# Patient Record
Sex: FEMALE | Race: WHITE | NOT HISPANIC OR LATINO | Employment: UNEMPLOYED | URBAN - METROPOLITAN AREA
[De-identification: names, ages, dates, MRNs, and addresses within clinical notes are randomized per-mention and may not be internally consistent; named-entity substitution may affect disease eponyms.]

---

## 2017-01-18 ENCOUNTER — ALLSCRIPTS OFFICE VISIT (OUTPATIENT)
Dept: OTHER | Facility: OTHER | Age: 42
End: 2017-01-18

## 2017-02-22 DIAGNOSIS — Z12.31 ENCOUNTER FOR SCREENING MAMMOGRAM FOR MALIGNANT NEOPLASM OF BREAST: ICD-10-CM

## 2017-03-20 DIAGNOSIS — E03.9 HYPOTHYROIDISM: ICD-10-CM

## 2017-11-14 ENCOUNTER — ALLSCRIPTS OFFICE VISIT (OUTPATIENT)
Dept: OTHER | Facility: OTHER | Age: 42
End: 2017-11-14

## 2017-11-15 NOTE — PROGRESS NOTES
Assessment    1  Upper respiratory infection (465 9) (J06 9)    Plan  Upper respiratory infection    · GuaiFENesin -10 MG/5ML Oral Syrup; TAKE 2 TEASPOONS EVERY 6HOURS AS NEEDED FOR COUGH   · Cefprozil 500 MG Oral Tablet; take 1 tablet twice a day   · Follow Up if Not Better Evaluation and Treatment  Follow-up  Status: Complete  Done:14Nov2017 02:53PM   · Drink at least 6 glasses of water or juice a day ; Status:Complete;   Done: 14Nov201702:53PM   · You need to quit smoking ; Status:Complete;   Done: 73VME2921 02:53PM   · Call [phone] if: The symptoms are not better in 7 days ; Status:Complete;   Done:14Nov2017 02:53PM   · Call [phone] if: The symptoms seem worse ; Status:Complete;   Done:14Nov2017 02:53PM    Discussion/Summary    Script for codeine cough suppressant for nighttime  Claritin 10 milligrams daily for nasal congestion Sample of Symbicort 160/4 5 two puffs twice a day while on antibiotics  Chief Complaint    1  Cough   2  Wheezing    History of Present Illness  HPI: Patient presents with acute onset productive cough with wheezing over the last 24 hours  Nasal congestion  She has been using an over-the-counter cold remedy  Smoker less than half pack per day  No history of seasonal allergies or asthma  She was treated for pneumonia last year      Review of Systems   Constitutional: no fever-- and-- no chills  ENT: no earache,-- no sore throat-- and-- no nasal discharge  Cardiovascular: no chest pain  Respiratory: as noted in HPI,-- no shortness of breath,-- no orthopnea-- and-- no PND  Gastrointestinal: no nausea,-- no vomiting-- and-- no diarrhea  Musculoskeletal: myalgias  Integumentary: no rashes  Neurological: no headache  Active Problems  1  Abnormal finding on mammography (793 80) (R92 8)   2  Bereavement (V62 82) (Z63 4)   3  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)   4  Hypothyroidism (244 9) (E03 9)   5   Moderate single current episode of major depressive disorder (296 22) (F32 1)    Past Medical History  1  History of Community acquired pneumonia (5) (J18 9)   2  History of acute bronchitis (V12 69) (Z87 09)   3  History of acute sinusitis (V12 69) (Z87 09)   4  History of acute sinusitis (V12 69) (Z87 09)    Family History  Mother    1  Family history of Breast Cancer (V16 3)   2  Family history of Essential hypertension, benign  Father    3  Family history of Diabetes Mellitus (V18 0)    Social History     · Bereavement (V62 82) (Z63 4)   · Current every day smoker (305 1) (F17 200)   · No drug use   · Tobacco use (305 1) (Z72 0)   · 10 cigs/day for at least 20 year    Current Meds   1  ALPRAZolam 0 5 MG Oral Tablet; 1/2-1 tab prior to travel - can repeat q 8 hours - do not combine with driving or alcohol; Therapy: 25RFI8036 to (77 873 135); Last Rx:2017 Ordered   2  BuPROPion HCl ER (SR) 100 MG Oral Tablet Extended Release 12 Hour; TAKE 1 TABLET DAILY; Therapy: 58IPI2827 to (Evaluate:19Woj9503)  Requested for: 13LXC9562; Last Rx:2017 Ordered   3  Citalopram Hydrobromide 10 MG Oral Tablet; TAKE 1 TABLET DAILY; Therapy: 04FCF1869 to ([id number])  Requested for: 35IEY9990; Last Rx:2017 Ordered   4  Levothyroxine Sodium 125 MCG Oral Tablet; TAKE 1 TABLET BY MOUTH EVERY DAY; Therapy: 62HLO2663 to (XAMWOGOD:10AVW3316)  Requested for: 65UHW2840; Last Rx:2017 Ordered    Allergies  1  No Known Drug Allergies    Vitals   Recorded: 02RFF3553 02:29PM Recorded: 87FNB5881 02:26PM   Temperature  99 3 F, Tympanic   Heart Rate  82   Respiration  16   Systolic  609, LUE, Sitting   Diastolic  76, LUE, Sitting   Height  5 ft 3 in   Weight  179 lb 6 oz   BMI Calculated  31 77   BSA Calculated  1 85   O2 Saturation 97        Physical Exam   Constitutional  General appearance: No acute distress, well appearing and well nourished  Head and Face  Palpation of the face and sinuses: No sinus tenderness     Eyes  Conjunctiva and lids: No swelling, erythema or discharge  Ears, Nose, Mouth, and Throat  Otoscopic examination: Tympanic membranes translucent with normal light reflex  Canals patent without erythema  Oropharynx: Abnormal   The posterior pharynx was erythematous  Neck  Neck: Supple, symmetric, trachea midline, no masses  Thyroid: Normal, no thyromegaly  Pulmonary  Respiratory effort: No increased work of breathing or signs of respiratory distress  Auscultation of lungs: Clear to auscultation  no rales or crackles were heard bilaterally  no wheezing  no diminished breath sounds  Cardiovascular  Auscultation of heart: Normal rate and rhythm, normal S1 and S2, no murmurs  Lymphatic  Palpation of lymph nodes in neck: No lymphadenopathy  Skin  Skin and subcutaneous tissue: Normal without rashes or lesions         Signatures   Electronically signed by : BALWINDER Ferrer ; Nov 14 2017  2:54PM EST                       (Author)

## 2018-01-09 NOTE — PSYCH
Progress Note  Psychotherapy Provided [facility]: Individual Psychotherapy 50 minutes provided today  Goals addressed in session:   1-3 D: Pt was seen for Individual Therapy session  Lucila Vinson spoke about her feelings of immediate improvement upon starting Lexapro as prescribed by her pcp following her last therapy session with this worker  These changes in her feelings milo functioning wer reviewed at length  A: Lucila Vinson stated today that she is no longer struggling with the responsibilities of caring for her nephew and mother or in raising her own daughters now that she is feeling better  She is also enjoying herself and her life again  P: She agreed to remain on her SSRI and one additional session has been scheduled  Pain Scale and Suicide Risk [facility]: Current Pain Assessment: no pain   Behavioral Health Treatment Plan [address] [place] [address] [address]: Diagnosis and Treatment Plan explained to patient, patient relates understanding diagnosis and is agreeable to Treatment Plan  Assessment    1   Moderate single current episode of major depressive disorder (296 22) (F32 1)    Signatures   Electronically signed by : Evelyn Castro LCSW; Dec 20 2016  1:45PM EST                       (Author)

## 2018-01-10 NOTE — PSYCH
Progress Note  Psychotherapy Provided [facility]: Individual Psychotherapy 50 minutes provided today  Goals addressed in session:   1-3 D: Pt was seen for Individual Therapy session  Terence Baez spoke about her feelings of sadness  Reasons for these were processed and a PHQ-9 was completed  She reported that she is feeling"off the rails" in the words of her pcp  A: Terence Baez has become not only the caretaker of her nephew, since the murder of her sister, but now the caretaker of her mother since the passing of her father  She is struggling with these responsibilities and in raising her own daughters  P: She agreed to contact her pcp in order to start an SSRI and additional sessions were scheduled  Pain Scale and Suicide Risk [facility]: Current Pain Assessment: no pain   Behavioral Health Treatment Plan [address] [place] [address] [address]: Diagnosis and Treatment Plan explained to patient, patient relates understanding diagnosis and is agreeable to Treatment Plan  Assessment    1   Moderate single current episode of major depressive disorder (296 22) (F32 1)    Signatures   Electronically signed by : Karolina Erickson LCSW; Nov 16 2016  3:54PM EST                       (Author)

## 2018-01-10 NOTE — PSYCH
Behavioral Health Outpatient Intake    Referred By: DR Erendira Fowler  Intake Questions: there are no developmental disabilities  the patient does not have a hearing impairment  the patient does not have an ICM or CTT  patient is not taking injectable psychiatric medications  Employment: The patient is not employed  at [employer]  Emergency Contact Information:   Emergency Contact: Rose Ybarra   Relationship to Patient:    Phone Number: [phone]   Previous Psychiatric Treatment: She has not been previously seen by a psychiatrist     She has previously been seen by a therapist  ?, 6 YRS AGO   History: no  service  She has not had combat service  She was not activated into federal active duty as a member of the [employer] or [employer]  Insurance Subscriber: Rose Ybarra   : 174   Employer: [employer]   Primary Insurance: [facility]   Phone number: [phone]   ID number: [id number]   Group number: [id number]         Presenting Problem (in patient's words): GRIEF COUNSELING, FATHER PASSED IN MARCH, SISTER PASSED 5 YRS AGO  Substance Abuse: NONE  Previous Treatment: The patient has not been seen here in the past      Accepted as Patient   10/31/16 @ 8AM     Primary Care Physician: Fabio Jaffe 59   Electronically signed by : Cammie Patel, ; Sep 21 2016  9:48AM EST                       (Author)

## 2018-01-11 NOTE — PSYCH
1  Moderate single current episode of major depressive disorder (296 22) (F32 1)      Date of Initial Treatment Plan: 11/16/2016  Date of Current Treatment Plan: 11/16/2016  Treatment Plan 1  Area of Needs: I have high expectations and always get disappointed  I am angry and resentful  Long Term Goals:   I want to be truly happy  Target Date: 3/11/2017      I want to work through these feelings  Target Date: 3/11/2017      I want to take control of my life back   Target Date: 3/11/2017    Short Term Objectives:   Goal 1:   I will talk to my pcp and  about going on meds  Target Date: 2/11/2017      Goal 2:   I will use therapy tot alk about my past    Target Date: 2/11/2017      Goal 3:   I will return to the gym  I will get back to reading  Target Date: 2/11/2017      GOAL 1: Modality: Individual 2 x per month Target Date: 3/11/2017       The person(s) responsible for carrying out the plan is Marily  GOAL 2: Modality: Individual 2 x per month Target Date: 3/11/2017       The person(s) responsible for carrying out the plan is Gabe Wells  GOAL 3: Modality: Individual 2 x per month Target Date: 3/11/2017         The person(s) responsible for carrying out the plan is Marily  The expected length of service is 3/11/2017  CLIENT COMMENTS / Please share your thoughts, feelings, need and/or experiences regarding your treatment plan: _____________________________________________________________________________________________________________________________________________________________________________________________________________________________________________________________________________________________________________________ Date/Time: ______________     Patient Signature: _________________________________ Date/Time: ______________        1  Abnormal finding on mammography (723 65) (R92 8)   2   Bee sting, accidental or unintentional, initial encounter (989 5,E905 3) (T63 441A)   3  Bereavement (V62 82) (Z63 4)   4  Community acquired pneumonia (5) (J18 9)   5  Encounter for gynecological examination without abnormal finding (V72 31) (Z01 419)   6  Encounter for screening mammogram for breast cancer (V76 12) (Z12 31)   7  Head pain (784 0) (R51)   8   Hypothyroidism (244 9) (E03 9)     Electronically signed by : Grayson Banuelos LCSW; Nov 16 2016  3:04PM EST                       (Author)

## 2018-01-12 NOTE — PSYCH
History of Present Illness    Pre-morbid level of function and History of Present Illness:  Dad  in March  ( there is a story here) That is not why she is here-- 6 years ago, all of it started  moved to  4 yrs ago  Selenealan Quijano lives with them  [place]  Reason for evaluation and partial hospitalization as an alternative to inpatient hospitalization:   I am raising my sister's son-- he looks like her, he acts like her  I am so mad at her  She brought so much bad into my life  This is so hard for me  I never thought past the moment I did it  I am his Robbert Cornea  He listens to me  He is a follower, though  His dad is a whole other story  He is in residential now  Family Constellation (include parents, relationship with each and pertinent Psych/Medical History): Mother: lives with her and sister-- "joint custody" , , feels bad for her mom- just lost her best friend  Spouse: Anna Ortiz, 15 yrs  Father:   Children: [id number]   Sibling: Nayla Smaller   Sibling: Crystal (youngest)   Children: Jammie Hernandez 9   Other: Saritha Terrell   The patient relates best to - my voice of reason  Domestic Violence: No past history of domestic violence  The patient is not currently experiencing domestic violence  There is not suspected domestic violence  There is no history of child abuse  There is no history of sexual abuse  Additional Comments related to family/relationships/peer support: raised by mom, dad,   youngest sister spoiled  then she started drugs at 15-- parents raised her son since 23 yrs old, enabled her this caused Adelaida Meridaser to fight with parents all the time  Zach Martinezharish was 6 when her sister was on streets doing heroin and bringing son with her  They had confrontation with sister-- she took Kati Quijano in  Sister on the street, refusing to get clean  He is living with them, but without custody  Can't enroll him in school without custody-- his dad had not been in his life   Court ordered 2 hr visitation with dad now every week  Sister nowhere to be found  School or Work History (strengths/limitations/needs: caretaker- I have a really good life  LEISURE ASSESSMENT (Include past and present hobbies/interests and level of involvement (Ex: Group/Club Affiliations): I used to go to the gym, have not since my father   (March) My life is my kids  I like to go to [employer], amusement [place]  Preferred language is   Religions affiliations and level of involvement [employer]  Substance/Route/Age/Amount/Frequency/Last Use: none  HEALTH ASSESSMENT: She has not lost 10 lbs or more in the last 6 months without trying  She has not had decreased appetite for 5 days or more  She has not gained 10 lbs or more in the last 6 months without trying  no nausea  no vomiting  no diarrhea  no referral to PCP needed  no referral to nutritionist needed  no pregnancy  She is not receiving prenatal care  not referred to PCP  PCP not notified  LEGAL: No Mental Health Advance Directive or Power of  on file  She does not want an information packet about Mental Health Advance Directives  The following ratings are based on my observation of this patient over the last intake  Risk of Harm to Self:   Demographic risk factors include , alaskan, or native [place]  Risk of Harm to Others: The following interventions are recommended: no intervention changes  Review of Systems  no anxiety, no depression, no euphoria, no emotional lability, no hostility, not suidical, no compulsive behavior, no impulsive behavior, no unusual behavior, no violent behavior, no disturbing or unusual thoughts, feelings, or sensations, no unreasonable or irrational fears, no magical thinking, not having fantasies, no interpersonal relationship problems, no emotional problems/concerns, no sleep disturbances, normal functioning ability, no personality change and no character deficiency           Constitutional: No fever, no chills, feels well, no tiredness, no recent weight gain or weight loss  Eyes: No complaints of eye pain, no red eyes, no eyesight problems, no discharge, no dry eyes, no itching of eyes  ENT: no complaints of earache, no loss of hearing, no nose bleeds, no nasal discharge, no sore throat, no hoarseness  Cardiovascular: No complaints of slow heart rate, no fast heart rate, no chest pain, no palpitations, no leg claudication, no lower extremity edema  Respiratory: No complaints of shortness of breath, no wheezing, no cough, no SOB on exertion, no orthopnea, no PND  Gastrointestinal: No complaints of abdominal pain, no constipation, no nausea or vomiting, no diarrhea, no bloody stools  Musculoskeletal: No complaints of arthralgias, no myalgias, no joint swelling or stiffness, no limb pain or swelling  Integumentary: No complaints of skin rash or lesions, no itching, no skin wounds, no breast pain or lump  Neurological: No complaints of headache, no confusion, no convulsions, no numbness, no dizziness or fainting, no tingling, no limb weakness, no difficulty walking  Psychiatric: Not suicidal, no sleep disturbance, no anxiety or depression, no change in personality, no emotional problems  Endocrine: No complaints of proptosis, no hot flashes, no muscle weakness, no deepening of the voice, no feelings of weakness  Hematologic/Lymphatic: No complaints of swollen glands, no swollen glands in the neck, does not bleed easily, does not bruise easily  ROS reviewed  Active Problems    1  Abnormal finding on mammography (793 80) (R92 8)   2  Bee sting, accidental or unintentional, initial encounter (989 5,E905 3) (T63 441A)   3  Bereavement (V62 82) (Z63 4)   4  Community acquired pneumonia (5) (J18 9)   5  Encounter for gynecological examination without abnormal finding (V72 31) (Z01 419)   6  Head pain (784 0) (R51)   7  Hypothyroidism (244 9) (E03 9)    Past Medical History    1  History of acute bronchitis (V12 69) (Z87 09)   2  History of acute sinusitis (V12 69) (Z87 09)   3  History of acute sinusitis (V12 69) (Z87 09)    The active problems and past medical history were reviewed and updated today  Past Psychiatric History    Past Psychiatric History: none    Surgical History    The surgical history was reviewed and updated today  Family Psych History  Mother    1  Family history of Breast Cancer (V16 3)   2  Family history of Essential hypertension, benign  Father    3  Family history of Diabetes Mellitus (V18 0)    The family history was reviewed and updated today  Substance Abuse Hx    Substance Abuse History: see above  Social History    · Bereavement (V62 82) (Z63 4)   · Current every day smoker (305 1) (F17 200)   · No drug use   · Tobacco use (305 1) (Z72 0)  The social history was reviewed and updated today  Current Meds   1  Benzonatate 100 MG Oral Capsule; 1 PO Q 8 HOURS FOR COUGH AND TWO AT HS;   Therapy: 92HJV3516 to (Evaluate:06Oct2016)  Requested for: 59CCX6321; Last   Rx:86Oof5810 Ordered   2  Cefprozil 500 MG Oral Tablet; TAKE 1 TABLET EVERY 12 HOURS DAILY; Therapy: 05HGL3698 to (96 403085)  Requested for: 73GWS9322; Last   Rx:74Olq4636 Ordered   3  Levothyroxine Sodium 125 MCG Oral Tablet; TAKE 1 TABLET DAILY; Therapy: 12NKT3566 to (Evaluate:44Gds5907)  Requested for: 54HJD5861; Last   Rx:49Omv7870 Ordered   4  PredniSONE 10 MG Oral Tablet; 1 PO BID FOR 3 DAYS; Therapy: 75TRW3973 to (Evaluate:75Ubd6253)  Requested for: 74GJS3476; Last   Rx:95Akj4876 Ordered    Allergies    1  No Known Drug Allergies    Physical Exam    Appearance: was calm and cooperative and good eye contact  Observed mood: affect appropriate  Speech: a normal rate  Thought processes: coherent/organized  Hallucinations: no hallucinations present  Thought Content: no delusions  Abnormal Thoughts: The patient has no suicidal thoughts     The patient is experiencing no localized pain  DSM    Provisional Diagnosis: adjustment disorder  Assessment    1   Adjustment disorder with mixed anxiety and depressed mood (309 28) (F43 23)    Future Appointments    Date/Time Provider Specialty Site   11/02/2016 10:30 AM Nicole Nguyen, [facility] Ob[facility]etrics[facility]Gynecology [address] OBGYN     Signatures   Electronically signed by : Chan Fitch LCSW; Oct 31 2016  9:04AM EST                       (Author)    Electronically signed by : BALWINDER Roberts ; Nov 5 2016  6:03PM EST                       (Author)

## 2018-01-13 VITALS
SYSTOLIC BLOOD PRESSURE: 108 MMHG | BODY MASS INDEX: 31.54 KG/M2 | HEIGHT: 63 IN | RESPIRATION RATE: 16 BRPM | WEIGHT: 178 LBS | TEMPERATURE: 96.3 F | DIASTOLIC BLOOD PRESSURE: 72 MMHG | HEART RATE: 72 BPM

## 2018-01-14 VITALS
BODY MASS INDEX: 31.79 KG/M2 | WEIGHT: 179.38 LBS | SYSTOLIC BLOOD PRESSURE: 130 MMHG | TEMPERATURE: 99.3 F | DIASTOLIC BLOOD PRESSURE: 76 MMHG | HEART RATE: 82 BPM | OXYGEN SATURATION: 97 % | HEIGHT: 63 IN | RESPIRATION RATE: 16 BRPM

## 2018-01-15 NOTE — RESULT NOTES
Verified Results  (Q) TSH, 3RD GENERATION W/REFLEX TO FT4 89RKJ1172 11:45AM Enoch Valdez   REPORT COMMENT:  FASTING:NO     Test Name Result Flag Reference   TSH W/REFLEX TO FT4 1 20 mIU/L     Reference Range                         > or = 20 Years  0 40-4 50                              Pregnancy Ranges            First trimester    0 26-2 66            Second trimester   0 55-2 73            Third trimester    0 43-2 91       Discussion/Summary   Thyroid level is in normal range

## 2018-02-13 ENCOUNTER — TELEPHONE (OUTPATIENT)
Dept: FAMILY MEDICINE CLINIC | Facility: CLINIC | Age: 43
End: 2018-02-13

## 2018-02-13 ENCOUNTER — OFFICE VISIT (OUTPATIENT)
Dept: OBGYN CLINIC | Facility: CLINIC | Age: 43
End: 2018-02-13
Payer: COMMERCIAL

## 2018-02-13 VITALS
WEIGHT: 185 LBS | DIASTOLIC BLOOD PRESSURE: 78 MMHG | BODY MASS INDEX: 32.78 KG/M2 | HEIGHT: 63 IN | SYSTOLIC BLOOD PRESSURE: 120 MMHG

## 2018-02-13 DIAGNOSIS — Z01.419 ENCOUNTER FOR ANNUAL ROUTINE GYNECOLOGICAL EXAMINATION: Primary | ICD-10-CM

## 2018-02-13 DIAGNOSIS — Z12.39 SCREENING FOR BREAST CANCER: ICD-10-CM

## 2018-02-13 PROCEDURE — 99396 PREV VISIT EST AGE 40-64: CPT | Performed by: PHYSICIAN ASSISTANT

## 2018-02-13 RX ORDER — CITALOPRAM 10 MG/1
10 TABLET ORAL DAILY
Refills: 0 | COMMUNITY
Start: 2017-12-15 | End: 2019-02-03 | Stop reason: SDUPTHER

## 2018-02-13 RX ORDER — CLINDAMYCIN AND BENZOYL PEROXIDE 10; 50 MG/G; MG/G
GEL TOPICAL
Refills: 0 | COMMUNITY
Start: 2018-01-25 | End: 2020-05-15 | Stop reason: ALTCHOICE

## 2018-02-13 RX ORDER — LEVOTHYROXINE SODIUM 0.12 MG/1
TABLET ORAL
COMMUNITY
Start: 2015-01-25 | End: 2018-02-27 | Stop reason: SDUPTHER

## 2018-02-13 NOTE — PROGRESS NOTES
Assessment/Plan:    Annual GYN exam-pap deferred, mammo ordered     Diagnoses and all orders for this visit:    Encounter for annual routine gynecological examination    Screening for breast cancer  -     Mammo screening bilateral w 3d & cad; Future    Other orders  -     citalopram (CeleXA) 10 mg tablet; Take 10 mg by mouth daily  -     clindamycin-benzoyl peroxide (BENZACLIN) gel;   -     levothyroxine 125 mcg tablet;           Subjective:      Patient ID: Sudha Mathis is a 43 y o  female  Pt for annual GYN exam  Periods regular tolerable without complaints  Withdrawal for Bc  No problems w Ic  No vaginal d/c or GYN complaints  NO perimenopausal complaints  Does c/o some urianary leakage, worse w working out  Advised pt timed bladder emptying  Advised bladder irritants to avoid  Advised tobacco cessation  Poise impressa samples given to trial as well        The following portions of the patient's history were reviewed and updated as appropriate: allergies, current medications, past family history, past medical history, past social history, past surgical history and problem list     Review of Systems      Objective:    Vitals:    02/13/18 0943   BP: 120/78     Family History   Problem Relation Age of Onset   Salvador Mike Breast cancer Mother     Hypertension Mother     Diabetes Father     Heart disease Father      Past Medical History:   Diagnosis Date    Depression     Disease of thyroid gland      Social History     Social History    Marital status: /Civil Union     Spouse name: N/A    Number of children: N/A    Years of education: N/A     Occupational History    Not on file       Social History Main Topics    Smoking status: Current Every Day Smoker     Packs/day: 0 50     Years: 25 00     Types: Cigarettes    Smokeless tobacco: Never Used    Alcohol use No    Drug use: No    Sexual activity: Yes     Partners: Male     Other Topics Concern    Not on file     Social History Narrative    No narrative on file     History reviewed  No pertinent surgical history  Menstrual History:  OB History      Para Term  AB Living    2 2       2    SAB TAB Ectopic Multiple Live Births            2         Menarche age: 6  Patient's last menstrual period was 2018          Physical Exam

## 2018-02-14 DIAGNOSIS — E03.8 HYPOTHYROIDISM DUE TO HASHIMOTO'S THYROIDITIS: Primary | ICD-10-CM

## 2018-02-14 DIAGNOSIS — E06.3 HYPOTHYROIDISM DUE TO HASHIMOTO'S THYROIDITIS: Primary | ICD-10-CM

## 2018-02-27 ENCOUNTER — TRANSCRIBE ORDERS (OUTPATIENT)
Dept: RADIOLOGY | Facility: CLINIC | Age: 43
End: 2018-02-27

## 2018-02-27 DIAGNOSIS — E06.3 HYPOTHYROIDISM DUE TO HASHIMOTO'S THYROIDITIS: Primary | ICD-10-CM

## 2018-02-27 DIAGNOSIS — E03.8 HYPOTHYROIDISM DUE TO HASHIMOTO'S THYROIDITIS: Primary | ICD-10-CM

## 2018-02-27 RX ORDER — LEVOTHYROXINE SODIUM 0.12 MG/1
TABLET ORAL
Qty: 90 TABLET | Refills: 3 | Status: SHIPPED | OUTPATIENT
Start: 2018-02-27 | End: 2019-05-02 | Stop reason: SDUPTHER

## 2018-03-01 ENCOUNTER — HOSPITAL ENCOUNTER (OUTPATIENT)
Dept: RADIOLOGY | Age: 43
Discharge: HOME | End: 2018-03-01
Payer: COMMERCIAL

## 2018-03-01 DIAGNOSIS — Z12.39 SCREENING FOR BREAST CANCER: ICD-10-CM

## 2018-03-01 PROCEDURE — 77063 BREAST TOMOSYNTHESIS BI: CPT

## 2018-03-01 PROCEDURE — 77067 SCR MAMMO BI INCL CAD: CPT

## 2018-04-11 LAB
ALBUMIN SERPL-MCNC: 4 G/DL (ref 3.6–5.1)
ALBUMIN/GLOB SERPL: 1.6 (CALC) (ref 1–2.5)
ALP SERPL-CCNC: 86 U/L (ref 33–115)
ALT SERPL-CCNC: 39 U/L (ref 6–29)
AST SERPL-CCNC: 25 U/L (ref 10–30)
BILIRUB SERPL-MCNC: 0.5 MG/DL (ref 0.2–1.2)
BUN SERPL-MCNC: 14 MG/DL (ref 7–25)
BUN/CREAT SERPL: ABNORMAL (CALC) (ref 6–22)
CALCIUM SERPL-MCNC: 8.6 MG/DL (ref 8.6–10.2)
CHLORIDE SERPL-SCNC: 108 MMOL/L (ref 98–110)
CHOLEST SERPL-MCNC: 160 MG/DL
CHOLEST/HDLC SERPL: 4.2 (CALC)
CO2 SERPL-SCNC: 24 MMOL/L (ref 20–31)
CREAT SERPL-MCNC: 0.84 MG/DL (ref 0.5–1.1)
GLOBULIN SER CALC-MCNC: 2.5 G/DL (CALC) (ref 1.9–3.7)
GLUCOSE SERPL-MCNC: 89 MG/DL (ref 65–99)
HDLC SERPL-MCNC: 38 MG/DL
LDLC SERPL CALC-MCNC: 95 MG/DL (CALC)
NONHDLC SERPL-MCNC: 122 MG/DL (CALC)
POTASSIUM SERPL-SCNC: 4.2 MMOL/L (ref 3.5–5.3)
PROT SERPL-MCNC: 6.5 G/DL (ref 6.1–8.1)
SL AMB EGFR AFRICAN AMERICAN: 99 ML/MIN/1.73M2
SL AMB EGFR NON AFRICAN AMERICAN: 85 ML/MIN/1.73M2
SODIUM SERPL-SCNC: 138 MMOL/L (ref 135–146)
TRIGL SERPL-MCNC: 160 MG/DL
TSH SERPL-ACNC: 3.7 MIU/L

## 2018-04-12 ENCOUNTER — OFFICE VISIT (OUTPATIENT)
Dept: FAMILY MEDICINE CLINIC | Facility: CLINIC | Age: 43
End: 2018-04-12
Payer: COMMERCIAL

## 2018-04-12 ENCOUNTER — TELEPHONE (OUTPATIENT)
Dept: FAMILY MEDICINE CLINIC | Facility: CLINIC | Age: 43
End: 2018-04-12

## 2018-04-12 VITALS
TEMPERATURE: 98 F | WEIGHT: 181.8 LBS | HEIGHT: 63 IN | DIASTOLIC BLOOD PRESSURE: 80 MMHG | BODY MASS INDEX: 32.21 KG/M2 | SYSTOLIC BLOOD PRESSURE: 130 MMHG

## 2018-04-12 DIAGNOSIS — F40.243 FEAR OF FLYING: Primary | ICD-10-CM

## 2018-04-12 DIAGNOSIS — Z00.00 WELL ADULT EXAM: Primary | ICD-10-CM

## 2018-04-12 DIAGNOSIS — E03.8 HYPOTHYROIDISM DUE TO HASHIMOTO'S THYROIDITIS: ICD-10-CM

## 2018-04-12 DIAGNOSIS — E04.1 LEFT THYROID NODULE: ICD-10-CM

## 2018-04-12 DIAGNOSIS — E06.3 HYPOTHYROIDISM DUE TO HASHIMOTO'S THYROIDITIS: ICD-10-CM

## 2018-04-12 PROCEDURE — 99396 PREV VISIT EST AGE 40-64: CPT | Performed by: INTERNAL MEDICINE

## 2018-04-12 RX ORDER — ALPRAZOLAM 0.5 MG/1
0.5 TABLET ORAL 2 TIMES DAILY PRN
Qty: 10 TABLET | Refills: 0 | Status: SHIPPED | OUTPATIENT
Start: 2018-04-12 | End: 2019-03-22 | Stop reason: SDUPTHER

## 2018-04-12 NOTE — TELEPHONE ENCOUNTER
Patient called   She is flying to [facility] in 2 weeks and wants to know If you will give her alprazolam 0 5 mg # 10   1/2-1 tab prior to travel can repeat q 8 hrs prn   Again for her

## 2018-04-12 NOTE — PROGRESS NOTES
ASSESSMENT and PLAN:  Lizz Mendez is a 37 y o  female with:   Problem List Items Addressed This Visit     Hypothyroidism    Relevant Orders    Comprehensive metabolic panel    TSH, 3rd generation    Lipid panel    Well adult exam - Primary    BMI 32 0-32 9,adult    Relevant Orders    Ambulatory referral to Weight Management    Left thyroid nodule    Relevant Orders     thyroid    Comprehensive metabolic panel    TSH, 3rd generation    Lipid panel          SUBJECTIVE:  Lizz Mendez is a 37 y o  female who presents today with a chief complaint of Physical Exam    Patient here for annual exam;    She feels well   She had labs done- tg were 160 and her one liver enzyme was elevated slightly   She joined a gym in January  She feels like her metabolism is at a standstill- she hasn't lost weight  Review of Systems   Constitutional: Negative  HENT: Negative  Eyes: Negative  Respiratory: Negative  Cardiovascular: Negative  Gastrointestinal: Negative  Endocrine: Negative  Genitourinary: Negative  Musculoskeletal: Negative  Skin: Negative  Allergic/Immunologic: Negative  Neurological: Negative  Hematological: Negative  Psychiatric/Behavioral: Negative  I have reviewed the patient's PMH, Social History, Medication List and Allergies  OBJECTIVE:  /80   Temp 98 °F (36 7 °C)   Ht 5' 3" (1 6 m)   Wt 82 5 kg (181 lb 12 8 oz)   BMI 32 20 kg/m²   Physical Exam   Constitutional: She is oriented to person, place, and time  She appears well-developed and well-nourished  HENT:   Head: Normocephalic and atraumatic  Right Ear: External ear normal    Left Ear: External ear normal    Nose: Nose normal    Mouth/Throat: Oropharynx is clear and moist    Eyes: Conjunctivae and EOM are normal  Pupils are equal, round, and reactive to light  Neck: Normal range of motion  Neck supple  No tracheal deviation present   Thyromegaly (left thyroid nodule noted superior aspect; no cervical adenopathy) present  Cardiovascular: Normal rate, regular rhythm, normal heart sounds and intact distal pulses  Pulmonary/Chest: Effort normal and breath sounds normal  No respiratory distress  She has no wheezes  She has no rales  Abdominal: Soft  Bowel sounds are normal  She exhibits no distension  There is no tenderness  There is no rebound  Musculoskeletal: Normal range of motion  Neurological: She is alert and oriented to person, place, and time  She has normal reflexes  No cranial nerve deficit  Coordination normal    Skin: Skin is warm and dry  No rash noted  No erythema  Psychiatric: She has a normal mood and affect  Her behavior is normal  Judgment and thought content normal    Nursing note and vitals reviewed

## 2018-04-12 NOTE — PATIENT INSTRUCTIONS
Call and schedule us of thyroid  Labs in 4 months- thyroid and repeat liver and cholesterol/tg  Consider bariatric (non surgical) eval

## 2018-04-13 ENCOUNTER — HOSPITAL ENCOUNTER (OUTPATIENT)
Dept: RADIOLOGY | Facility: MEDICAL CENTER | Age: 43
Discharge: HOME | End: 2018-04-13
Payer: COMMERCIAL

## 2018-04-13 DIAGNOSIS — E04.1 LEFT THYROID NODULE: ICD-10-CM

## 2018-04-13 PROCEDURE — 76536 US EXAM OF HEAD AND NECK: CPT

## 2018-04-20 ENCOUNTER — TELEPHONE (OUTPATIENT)
Dept: FAMILY MEDICINE CLINIC | Facility: CLINIC | Age: 43
End: 2018-04-20

## 2018-04-23 ENCOUNTER — TELEPHONE (OUTPATIENT)
Dept: FAMILY MEDICINE CLINIC | Facility: CLINIC | Age: 43
End: 2018-04-23

## 2018-04-23 DIAGNOSIS — R22.1 NECK MASS: Primary | ICD-10-CM

## 2018-04-23 NOTE — TELEPHONE ENCOUNTER
FYI  PATIENT JUST WANTED TO LET YOU KNOW SHE SCHEDULED CT SCAN OF THE NECK 4/26/18 [facility]

## 2018-04-23 NOTE — TELEPHONE ENCOUNTER
Spoke with patient  She said "we will have to go with the 2nd option because it is still there"  Can you place order for ct scan and I will call her back with info

## 2018-04-23 NOTE — TELEPHONE ENCOUNTER
Call pt-  Thyroid us did not show any thyroid tissue at all-  But I did feel a nodule - so we have 2 choices, I can examine her again and see if that area is still there- or I can have her get a ct scan of the neck and upper chest to make sure nothing is there-  There is radiation associated with the ct scan    Let meknow

## 2018-04-23 NOTE — TELEPHONE ENCOUNTER
Left detailed message on patient identified voicemail with contact info for central scheduling  I also asked pt to let us know when appt is scheduled because she might need auth through her insurance

## 2018-04-26 ENCOUNTER — HOSPITAL ENCOUNTER (OUTPATIENT)
Dept: CT IMAGING | Facility: HOSPITAL | Age: 43
Discharge: HOME | End: 2018-04-26
Payer: COMMERCIAL

## 2018-04-26 ENCOUNTER — TELEPHONE (OUTPATIENT)
Dept: FAMILY MEDICINE CLINIC | Facility: CLINIC | Age: 43
End: 2018-04-26

## 2018-04-26 DIAGNOSIS — R22.1 NECK MASS: ICD-10-CM

## 2018-04-26 PROCEDURE — 70491 CT SOFT TISSUE NECK W/DYE: CPT

## 2018-04-26 PROCEDURE — 71260 CT THORAX DX C+: CPT

## 2018-04-26 RX ADMIN — IOHEXOL 85 ML: 350 INJECTION, SOLUTION INTRAVENOUS at 10:03

## 2018-04-26 NOTE — TELEPHONE ENCOUNTER
Lm for pt to return call        ----- Message from Giovanni Oviedo DO sent at 4/26/2018  4:56 PM EDT -----  Please call the patient regarding her abnormal result   Call pt- ct of neck shows no abnormality- no swollen glands, no masses  -

## 2018-04-27 LAB
ALBUMIN SERPL-MCNC: 4.2 G/DL (ref 3.6–5.1)
ALBUMIN/GLOB SERPL: 1.8 (CALC) (ref 1–2.5)
ALP SERPL-CCNC: 78 U/L (ref 33–115)
ALT SERPL-CCNC: 11 U/L (ref 6–29)
AST SERPL-CCNC: 14 U/L (ref 10–30)
BILIRUB SERPL-MCNC: 0.4 MG/DL (ref 0.2–1.2)
BUN SERPL-MCNC: 8 MG/DL (ref 7–25)
BUN/CREAT SERPL: NORMAL (CALC) (ref 6–22)
CALCIUM SERPL-MCNC: 8.9 MG/DL (ref 8.6–10.2)
CHLORIDE SERPL-SCNC: 104 MMOL/L (ref 98–110)
CHOLEST SERPL-MCNC: 129 MG/DL
CHOLEST/HDLC SERPL: 2.8 (CALC)
CO2 SERPL-SCNC: 27 MMOL/L (ref 20–31)
CREAT SERPL-MCNC: 0.81 MG/DL (ref 0.5–1.1)
GLOBULIN SER CALC-MCNC: 2.4 G/DL (CALC) (ref 1.9–3.7)
GLUCOSE SERPL-MCNC: 86 MG/DL (ref 65–99)
HDLC SERPL-MCNC: 46 MG/DL
LDLC SERPL CALC-MCNC: 63 MG/DL (CALC)
NONHDLC SERPL-MCNC: 83 MG/DL (CALC)
POTASSIUM SERPL-SCNC: 4.2 MMOL/L (ref 3.5–5.3)
PROT SERPL-MCNC: 6.6 G/DL (ref 6.1–8.1)
SL AMB EGFR AFRICAN AMERICAN: 103 ML/MIN/1.73M2
SL AMB EGFR NON AFRICAN AMERICAN: 89 ML/MIN/1.73M2
SODIUM SERPL-SCNC: 137 MMOL/L (ref 135–146)
TRIGL SERPL-MCNC: 122 MG/DL
TSH SERPL-ACNC: 0.46 MIU/L

## 2018-05-03 ENCOUNTER — TELEPHONE (OUTPATIENT)
Dept: FAMILY MEDICINE CLINIC | Facility: CLINIC | Age: 43
End: 2018-05-03

## 2018-05-03 DIAGNOSIS — R91.8 ABNORMAL CT LUNG SCREENING: Primary | ICD-10-CM

## 2018-05-03 DIAGNOSIS — R91.1 PULMONARY NODULE LESS THAN 6 CM DETERMINED BY COMPUTED TOMOGRAPHY OF LUNG: Primary | ICD-10-CM

## 2018-05-03 NOTE — TELEPHONE ENCOUNTER
Left message for pt to return call      ----- Message from Oli Bañuelos DO sent at 5/3/2018 12:38 PM EDT -----  Please call the patient regarding her abnormal result  Ct of chest showed very tiny nodule on left-  Largest measured 4 mm- it is so small, it isnot characterized- they want a repeat in a year  i'll put in order for that

## 2018-05-04 NOTE — TELEPHONE ENCOUNTER
Patient notified regarding results of CT of chest & labs  She understand results and will repeat CT scan in 1 year

## 2018-09-05 ENCOUNTER — OFFICE VISIT (OUTPATIENT)
Dept: FAMILY MEDICINE CLINIC | Facility: CLINIC | Age: 43
End: 2018-09-05
Payer: COMMERCIAL

## 2018-09-05 VITALS
SYSTOLIC BLOOD PRESSURE: 102 MMHG | TEMPERATURE: 98.4 F | HEIGHT: 63 IN | HEART RATE: 80 BPM | WEIGHT: 183.6 LBS | RESPIRATION RATE: 14 BRPM | BODY MASS INDEX: 32.53 KG/M2 | DIASTOLIC BLOOD PRESSURE: 68 MMHG

## 2018-09-05 DIAGNOSIS — J40 BRONCHITIS: Primary | ICD-10-CM

## 2018-09-05 PROCEDURE — 3008F BODY MASS INDEX DOCD: CPT | Performed by: INTERNAL MEDICINE

## 2018-09-05 PROCEDURE — 99213 OFFICE O/P EST LOW 20 MIN: CPT | Performed by: INTERNAL MEDICINE

## 2018-09-05 RX ORDER — AZITHROMYCIN 250 MG/1
TABLET, FILM COATED ORAL
Qty: 6 TABLET | Refills: 0 | Status: SHIPPED | OUTPATIENT
Start: 2018-09-05 | End: 2018-09-09

## 2018-09-05 RX ORDER — BENZONATATE 100 MG/1
100 CAPSULE ORAL 3 TIMES DAILY PRN
Qty: 20 CAPSULE | Refills: 0 | Status: SHIPPED | OUTPATIENT
Start: 2018-09-05 | End: 2018-10-19

## 2018-09-05 RX ORDER — ALBUTEROL SULFATE 90 UG/1
2 AEROSOL, METERED RESPIRATORY (INHALATION) EVERY 6 HOURS PRN
Qty: 8.5 G | Refills: 0 | Status: SHIPPED | OUTPATIENT
Start: 2018-09-05 | End: 2019-03-22 | Stop reason: ALTCHOICE

## 2018-09-05 RX ORDER — AZITHROMYCIN 250 MG/1
TABLET, FILM COATED ORAL
Qty: 6 TABLET | Refills: 0 | Status: SHIPPED | OUTPATIENT
Start: 2018-09-05 | End: 2018-09-05 | Stop reason: SDUPTHER

## 2018-09-05 RX ORDER — PREDNISONE 10 MG/1
10 TABLET ORAL 2 TIMES DAILY WITH MEALS
Qty: 6 TABLET | Refills: 0 | Status: SHIPPED | OUTPATIENT
Start: 2018-09-05 | End: 2018-10-19

## 2018-09-05 NOTE — PROGRESS NOTES
ASSESSMENT and PLAN:  Lorena Guerrier is a 37 y o  female with:   Problem List Items Addressed This Visit     Bronchitis - Primary     Add abx  Rodrigo lif no better  Rest  Hydrate  Add mucinex         Relevant Medications    predniSONE 10 mg tablet    albuterol (PROAIR HFA) 90 mcg/act inhaler    azithromycin (ZITHROMAX) 250 mg tablet    benzonatate (TESSALON PERLES) 100 mg capsule          SUBJECTIVE:  Lorena Guerrier is a 37 y o  female who presents today with a chief complaint of Cough (for 3 days); Nasal Congestion; and Chest Pain    Patient with 3 days hx of cough, nasal congestion, and sinus congestion; She is feeling poorly; Review of Systems   Constitutional: Negative  Negative for fatigue and fever  HENT: Positive for congestion, ear pain, postnasal drip, rhinorrhea, sinus pain and sinus pressure  Negative for ear discharge, facial swelling, hearing loss, mouth sores, nosebleeds, sore throat, tinnitus, trouble swallowing and voice change  Eyes: Negative  Respiratory: Positive for cough (purulent sputum for 3 days )  Cardiovascular: Negative  Gastrointestinal: Negative  Endocrine: Negative  Genitourinary: Negative  Musculoskeletal: Negative  Allergic/Immunologic: Negative  Neurological: Negative  Hematological: Negative  Psychiatric/Behavioral: Negative  I have reviewed the patient's PMH, Social History, Medication List and Allergies  OBJECTIVE:  /68 (BP Location: Left arm, Patient Position: Sitting, Cuff Size: Large)   Pulse 80   Temp 98 4 °F (36 9 °C)   Resp 14   Ht 5' 3" (1 6 m)   Wt 83 3 kg (183 lb 9 6 oz)   LMP 08/18/2018 (Approximate)   Breastfeeding? No   BMI 32 52 kg/m²   Physical Exam   Constitutional: She is oriented to person, place, and time  She appears well-developed and well-nourished  HENT:   Head: Normocephalic and atraumatic     Right Ear: External ear normal    Left Ear: External ear normal    Mouth/Throat: Oropharyngeal exudate (post nasal drip ) present  Eyes: Conjunctivae and EOM are normal  Pupils are equal, round, and reactive to light  Right eye exhibits no discharge  Neck: Normal range of motion  Neck supple  No tracheal deviation present  No thyromegaly present  Cardiovascular: Normal rate, regular rhythm and normal heart sounds  No murmur heard  Pulmonary/Chest: Effort normal  She has wheezes (wheezing exp left mid lobe )  Musculoskeletal: Normal range of motion  Neurological: She is alert and oriented to person, place, and time  She has normal reflexes  Nursing note and vitals reviewed

## 2018-10-19 PROBLEM — J40 BRONCHITIS: Status: RESOLVED | Noted: 2018-09-05 | Resolved: 2018-10-19

## 2018-10-19 PROBLEM — R91.1 LUNG NODULE: Status: ACTIVE | Noted: 2018-10-19

## 2018-10-19 PROBLEM — Z00.00 WELL ADULT EXAM: Status: RESOLVED | Noted: 2018-02-13 | Resolved: 2018-10-19

## 2019-02-03 DIAGNOSIS — F32.A DEPRESSION, UNSPECIFIED DEPRESSION TYPE: Primary | ICD-10-CM

## 2019-02-03 RX ORDER — CITALOPRAM 10 MG/1
TABLET ORAL
Qty: 90 TABLET | Refills: 0 | Status: SHIPPED | OUTPATIENT
Start: 2019-02-03 | End: 2019-06-11 | Stop reason: SDUPTHER

## 2019-02-15 PROBLEM — Z01.419 WELL WOMAN EXAM: Status: ACTIVE | Noted: 2019-02-15

## 2019-02-15 NOTE — PROGRESS NOTES
Assessment/Plan   Diagnoses and all orders for this visit:    Well woman exam  -     GP Liquid-Based Pap + HPV Plus    Breast cancer screening  -     Mammo screening bilateral w 3d & cad; Future        Discussion    All questions have been answered to her satisfaction  RTO for APE or sooner if needed      Subjective     Pt for annual GYN exam  Doing well  Periods WNL come monthly without complaints  Not doing any hormonal BC, uses withdrawal for [facility]  Advised need for [facility] until her periods have stopped x 12 mths  Denies vaginal complaints or concerns  Valerie Johnson is a 37 y o  female who presents for annual well woman exam    LMP -2/4/19   No vulvar itch/burn; No vaginal itch/burn; No abn discharge or odor; No urinary sx - burning/pain/frequency/hematuria  (+) SBEs - no breast masses, asymmetry, nipple discharge or bleeding, changes in skin of breast, or breast tenderness bilaterally  No abd/pelvic pain or HAs; No menopausal symptoms: No hot flashes/night sweats, problems with intercourse, vaginal dryness; sleeping well  Pt is sexually active in a mutually monog/ sexual relationship; No issues with intercourse; She declines std/hiv/hep testing; Feels safe at home  Condom use:none  (+) PCP for routine Bw/care; History of abnormal Pap smear: none  Last mammo - 3/1/18 WNL      Review of Systems   Constitutional: Negative for fatigue, fever and unexpected weight change  HENT: Negative for dental problem, mouth sores, nosebleeds, rhinorrhea, sinus pressure, sinus pain and sore throat  Eyes: Negative for pain, discharge and visual disturbance  Respiratory: Negative for cough, chest tightness, shortness of breath and wheezing  Cardiovascular: Negative for chest pain, palpitations and leg swelling  Gastrointestinal: Negative for blood in stool, constipation, diarrhea, nausea and vomiting  Endocrine: Negative for polydipsia     Genitourinary: Negative for difficulty urinating, dyspareunia, dysuria, menstrual problem, pelvic pain, urgency, vaginal discharge and vaginal pain  Musculoskeletal: Negative for arthralgias, back pain and joint swelling  Allergic/Immunologic: Negative for environmental allergies  Neurological: Negative for seizures, light-headedness and headaches  Hematological: Does not bruise/bleed easily  Psychiatric/Behavioral: Negative for sleep disturbance  The patient is not nervous/anxious  The following portions of the patient's history were reviewed and updated as appropriate: current medications, past family history, past medical history, past social history, past surgical history and problem list          OB History        2    Para   2    Term                AB        Living   2       SAB        TAB        Ectopic        Multiple        Live Births   2                 Past Medical History:   Diagnosis Date    Depression     Disease of thyroid gland        No past surgical history on file      Family History   Problem Relation Age of Onset   Sundeep Mitchell Breast cancer Mother     Hypertension Mother     Diabetes Father     Heart disease Father        Social History     Socioeconomic History    Marital status: /Civil Union     Spouse name: Not on file    Number of children: Not on file    Years of education: Not on file    Highest education level: Not on file   Occupational History    Not on file   Social Needs    Financial resource strain: Not on file    Food insecurity:     Worry: Not on file     Inability: Not on file    Transportation needs:     Medical: Not on file     Non-medical: Not on file   Tobacco Use    Smoking status: Current Every Day Smoker     Packs/day: 0 50     Years: 25 00     Pack years: 12 50     Types: Cigarettes    Smokeless tobacco: Never Used   Substance and Sexual Activity    Alcohol use: No    Drug use: No    Sexual activity: Yes     Partners: Male   Lifestyle    Physical activity:     Days per week: Not on file     Minutes per session: Not on file    Stress: Not on file   Relationships    Social connections:     Talks on phone: Not on file     Gets together: Not on file     Attends Tenri[employer] service: Not on file     Active member of club or organization: Not on file     Attends meetings of clubs or organizations: Not on file     Relationship status: Not on file    Intimate partner violence:     Fear of current or ex partner: Not on file     Emotionally abused: Not on file     Physically abused: Not on file     Forced sexual activity: Not on file   Other Topics Concern    Not on file   Social History Narrative    Not on file         Current Outpatient Medications:     albuterol (PROAIR HFA) 90 mcg/act inhaler, Inhale 2 puffs every 6 (six) hours as needed for wheezing, Disp: 8 5 g, Rfl: 0    ALPRAZolam (XANAX) 0 5 mg tablet, Take 1 tablet (0 5 mg total) by mouth 2 (two) times a day as needed for anxiety, Disp: 10 tablet, Rfl: 0    citalopram (CeleXA) 10 mg tablet, TAKE 1 TABLET DAILY  , Disp: 90 tablet, Rfl: 0    clindamycin-benzoyl peroxide (BENZACLIN) gel, , Disp: , Rfl: 0    levothyroxine 125 mcg tablet, take 1 tablet by mouth once daily, Disp: 90 tablet, Rfl: 3    No Known Allergies    Objective   Vitals:    02/19/19 0948   BP: 118/78   BP Location: Left arm   Patient Position: Sitting   Cuff Size: Large   Weight: 85 7 kg (189 lb)   Height: 5' 3" (1 6 m)     Physical Exam   Constitutional: She is oriented to person, place, and time  She appears well-developed and well-nourished  HENT:   Head: Normocephalic and atraumatic  Cardiovascular: Normal rate and regular rhythm  Pulmonary/Chest: Effort normal and breath sounds normal  Right breast exhibits no inverted nipple, no mass, no nipple discharge, no skin change and no tenderness  Left breast exhibits no inverted nipple, no mass, no nipple discharge, no skin change and no tenderness  Breasts are symmetrical (breasts thick and fibrous b/l)  Abdominal: Soft  She exhibits no distension and no mass  There is no rebound and no guarding  Genitourinary: Vagina normal and uterus normal  Rectal exam shows guaiac negative stool  No vaginal discharge found  Neurological: She is alert and oriented to person, place, and time  Skin: Skin is warm and dry  Psychiatric: She has a normal mood and affect  Patient Instructions   Pt doing well  Pap done  Mammo ordered  Advised smoking cessation although pt concerned for wt gain and not ready to quit yet

## 2019-02-19 ENCOUNTER — ANNUAL EXAM (OUTPATIENT)
Dept: OBGYN CLINIC | Facility: CLINIC | Age: 44
End: 2019-02-19
Payer: COMMERCIAL

## 2019-02-19 VITALS
SYSTOLIC BLOOD PRESSURE: 118 MMHG | HEIGHT: 63 IN | BODY MASS INDEX: 33.49 KG/M2 | WEIGHT: 189 LBS | DIASTOLIC BLOOD PRESSURE: 78 MMHG

## 2019-02-19 DIAGNOSIS — Z01.419 WELL WOMAN EXAM: Primary | ICD-10-CM

## 2019-02-19 DIAGNOSIS — Z12.39 BREAST CANCER SCREENING: ICD-10-CM

## 2019-02-19 PROCEDURE — 99396 PREV VISIT EST AGE 40-64: CPT | Performed by: PHYSICIAN ASSISTANT

## 2019-02-19 NOTE — PATIENT INSTRUCTIONS
Pt doing well  Pap done  Mammo ordered  Advised smoking cessation although pt concerned for wt gain and not ready to quit yet

## 2019-02-25 LAB
HPV HR 12 DNA CVX QL NAA+PROBE: NOT DETECTED
HPV16 DNA SPEC QL NAA+PROBE: NOT DETECTED
HPV18 DNA SPEC QL NAA+PROBE: NOT DETECTED
THIN PREP CVX: NORMAL

## 2019-02-27 ENCOUNTER — TELEPHONE (OUTPATIENT)
Dept: OBGYN CLINIC | Facility: CLINIC | Age: 44
End: 2019-02-27

## 2019-02-27 NOTE — TELEPHONE ENCOUNTER
Reviewed pap results with patient, scheduled patient for repap for unsatisfactory pap in 2 weeks with Karla Perry

## 2019-03-05 ENCOUNTER — HOSPITAL ENCOUNTER (OUTPATIENT)
Dept: RADIOLOGY | Age: 44
Discharge: HOME | End: 2019-03-05
Payer: COMMERCIAL

## 2019-03-05 VITALS — WEIGHT: 180 LBS | BODY MASS INDEX: 31.89 KG/M2 | HEIGHT: 63 IN

## 2019-03-05 DIAGNOSIS — Z12.39 BREAST CANCER SCREENING: ICD-10-CM

## 2019-03-05 PROCEDURE — 77063 BREAST TOMOSYNTHESIS BI: CPT

## 2019-03-05 PROCEDURE — 77067 SCR MAMMO BI INCL CAD: CPT

## 2019-03-12 ENCOUNTER — OFFICE VISIT (OUTPATIENT)
Dept: OBGYN CLINIC | Facility: CLINIC | Age: 44
End: 2019-03-12

## 2019-03-12 VITALS
BODY MASS INDEX: 33.13 KG/M2 | DIASTOLIC BLOOD PRESSURE: 76 MMHG | HEIGHT: 63 IN | WEIGHT: 187 LBS | SYSTOLIC BLOOD PRESSURE: 120 MMHG

## 2019-03-12 DIAGNOSIS — R87.615 PAP SMEAR OF CERVIX UNSATISFACTORY: Primary | ICD-10-CM

## 2019-03-12 PROCEDURE — REPAP: Performed by: PHYSICIAN ASSISTANT

## 2019-03-12 NOTE — PROGRESS NOTES
Assessment/Plan:      There are no diagnoses linked to this encounter  Subjective:     Patient ID: Salima Sheffield is a 40 y o  female  Pt here for repap  Previous pap was WNL HPV but unable to evaluate cx cells due to obscuring inflammation  Pt without vaginal d/c or GYN concerns  Review of Systems   Constitutional: Negative for fatigue, fever and unexpected weight change  HENT: Negative for dental problem, mouth sores, nosebleeds, rhinorrhea, sinus pressure, sinus pain and sore throat  Eyes: Negative for pain, discharge and visual disturbance  Respiratory: Negative for cough, chest tightness, shortness of breath and wheezing  Cardiovascular: Negative for chest pain, palpitations and leg swelling  Gastrointestinal: Negative for blood in stool, constipation, diarrhea, nausea and vomiting  Endocrine: Negative for polydipsia  Genitourinary: Negative for difficulty urinating, dyspareunia, dysuria, menstrual problem, pelvic pain, urgency, vaginal discharge and vaginal pain  Musculoskeletal: Negative for arthralgias, back pain and joint swelling  Allergic/Immunologic: Negative for environmental allergies  Neurological: Negative for seizures, light-headedness and headaches  Hematological: Does not bruise/bleed easily  Psychiatric/Behavioral: Negative for sleep disturbance  The patient is not nervous/anxious  Objective:     Physical Exam   Constitutional: She is oriented to person, place, and time  She appears well-developed and well-nourished  Genitourinary: Vagina normal and uterus normal  No vaginal discharge found  Neurological: She is alert and oriented to person, place, and time  Psychiatric: She has a normal mood and affect  Her behavior is normal  Judgment and thought content normal        There are no Patient Instructions on file for this visit

## 2019-03-13 ENCOUNTER — TELEPHONE (OUTPATIENT)
Dept: FAMILY MEDICINE CLINIC | Facility: CLINIC | Age: 44
End: 2019-03-13

## 2019-03-13 DIAGNOSIS — E03.9 ACQUIRED HYPOTHYROIDISM: Primary | ICD-10-CM

## 2019-03-13 DIAGNOSIS — F32.1 MODERATE SINGLE CURRENT EPISODE OF MAJOR DEPRESSIVE DISORDER (HCC): ICD-10-CM

## 2019-03-13 DIAGNOSIS — E78.1 PURE HYPERGLYCERIDEMIA: ICD-10-CM

## 2019-03-13 PROBLEM — Z01.419 WELL WOMAN EXAM: Status: RESOLVED | Noted: 2019-02-15 | Resolved: 2019-03-13

## 2019-03-13 NOTE — TELEPHONE ENCOUNTER
Patient has an appt scheduled w you for 03/22/19  She has not had labs drawn since 04/2018  Did you want labs prior? If so, patient uses [facility]  She will p/u orders   I will call her when they are available

## 2019-03-18 LAB — THIN PREP CVX: NORMAL

## 2019-03-22 ENCOUNTER — OFFICE VISIT (OUTPATIENT)
Dept: FAMILY MEDICINE CLINIC | Facility: CLINIC | Age: 44
End: 2019-03-22
Payer: COMMERCIAL

## 2019-03-22 VITALS
RESPIRATION RATE: 16 BRPM | TEMPERATURE: 96.8 F | BODY MASS INDEX: 32.96 KG/M2 | SYSTOLIC BLOOD PRESSURE: 110 MMHG | HEIGHT: 63 IN | WEIGHT: 186 LBS | DIASTOLIC BLOOD PRESSURE: 60 MMHG | OXYGEN SATURATION: 98 % | HEART RATE: 68 BPM

## 2019-03-22 DIAGNOSIS — F40.243 FEAR OF FLYING: ICD-10-CM

## 2019-03-22 DIAGNOSIS — F32.1 MODERATE SINGLE CURRENT EPISODE OF MAJOR DEPRESSIVE DISORDER (HCC): ICD-10-CM

## 2019-03-22 DIAGNOSIS — Z00.00 ROUTINE ADULT HEALTH MAINTENANCE: Primary | ICD-10-CM

## 2019-03-22 DIAGNOSIS — R91.1 LUNG NODULE: ICD-10-CM

## 2019-03-22 DIAGNOSIS — E03.9 ACQUIRED HYPOTHYROIDISM: ICD-10-CM

## 2019-03-22 DIAGNOSIS — Z71.6 TOBACCO ABUSE COUNSELING: ICD-10-CM

## 2019-03-22 DIAGNOSIS — Z51.81 ENCOUNTER FOR THERAPEUTIC DRUG MONITORING: ICD-10-CM

## 2019-03-22 PROBLEM — E04.1 LEFT THYROID NODULE: Status: RESOLVED | Noted: 2018-04-12 | Resolved: 2019-03-22

## 2019-03-22 PROBLEM — E78.1 PURE HYPERGLYCERIDEMIA: Status: RESOLVED | Noted: 2019-03-13 | Resolved: 2019-03-22

## 2019-03-22 PROCEDURE — 4004F PT TOBACCO SCREEN RCVD TLK: CPT | Performed by: FAMILY MEDICINE

## 2019-03-22 PROCEDURE — 99406 BEHAV CHNG SMOKING 3-10 MIN: CPT | Performed by: FAMILY MEDICINE

## 2019-03-22 PROCEDURE — 3008F BODY MASS INDEX DOCD: CPT | Performed by: FAMILY MEDICINE

## 2019-03-22 PROCEDURE — 99396 PREV VISIT EST AGE 40-64: CPT | Performed by: FAMILY MEDICINE

## 2019-03-22 PROCEDURE — 99214 OFFICE O/P EST MOD 30 MIN: CPT | Performed by: FAMILY MEDICINE

## 2019-03-22 RX ORDER — ALPRAZOLAM 0.5 MG/1
0.5 TABLET ORAL DAILY PRN
Qty: 10 TABLET | Refills: 0 | Status: SHIPPED | OUTPATIENT
Start: 2019-03-22 | End: 2019-08-21 | Stop reason: ALTCHOICE

## 2019-03-22 NOTE — PROGRESS NOTES
FAMILY MEDICINE PROGRESS NOTE  Mohini Kang 40 y o  female   DATE: March 22, 2019     ASSESSMENT and PLAN:  Mohini Kang is a 40 y o  female with:     Acquired hypothyroidism  Lab Results   Component Value Date    [id number] 1 20 09/10/2016    TSH 0 46 04/26/2018     Well controlled on Synthroid 125mcg  Recheck TSH      Moderate single current episode of major depressive disorder ([address] )  Usually well controlled with Celexa 10mg, can consider Wellbutrin if patient agrees to try medications for smoking cessation    Lung nodule  4mm pulm nodule seen on CT chest last year, due for f/u CT this year  Advised on smoking cessation    Fear of flying  Uses Xanax about 1x/month, usually when flying to [facility]  Will get baseline UDS and sign new CSA    Last fill per PDMP in 2018  Representatives have queried the patient's controlled substance dispensing history in the Prescription Drug Monitoring Program in compliance with regulations before I have prescribed any controlled substances  The prescription history is consistent with prescribed therapy and our practice policies  Tobacco abuse counseling  Discussed with patient the risks of continued smoking  Discussed current use pattern- 0 5ppd>20 years  I advised patient to quit, and offered support  Pt expressed understanding, is pre-contemplative at this time  Asked patient to inform me when she sets a quit date  Offered assistance for whenever she is ready to quit  I spent approximately 6 minutes counseling the patient  Reviewed Chantix, Wellbutrin, nicotine replacement, hypnosis, pt not willing for any options at this time  SUBJECTIVE:  Mohini Kang is a 40 y o  female who presents today with a chief complaint of Physical Exam  Pt here for a physical and also  1  Hypothyroidism- usually controlled with Synthroid, didn't get her labs done demar  2  Depression- on Celexa, has been on it for years, doesn't get meltdowns anymore   Considering titrating off of it  She uses Xanax only when she flies to [facility]  But does fly 4-5x/year  3  Smoker- has smoked 10 cigs/day for >20 years, never tried to quit in the past  4  Pulmonary nodule- noted on CT chest last year, due for f/u    Review of Systems   Respiratory: Negative for shortness of breath  Cardiovascular: Negative for chest pain  Psychiatric/Behavioral: The patient is nervous/anxious  I have reviewed the patient's PMH, Social History, Medication List and Allergies as appropriate  OBJECTIVE:  /60   Pulse 68   Temp (!) 96 8 °F (36 °C)   Resp 16   Ht 5' 3" (1 6 m)   Wt 84 4 kg (186 lb)   LMP 03/06/2019 (Approximate)   SpO2 98%   BMI 32 95 kg/m²    Physical Exam   Constitutional: She appears well-developed and well-nourished  No distress  Cardiovascular: Normal rate, regular rhythm and normal heart sounds  Pulmonary/Chest: Effort normal and breath sounds normal  No respiratory distress  She has no wheezes  She has no rales  Skin: She is not diaphoretic  Psychiatric: She has a normal mood and affect  Her speech is normal and behavior is normal  Judgment and thought content normal  Cognition and memory are normal  She expresses no homicidal and no suicidal ideation  Vitals reviewed  Navid Christianson MD    Note: Portions of the record have been created with voice recognition software  Occasional wrong word or "sound a like" substitutions may have occurred due to the inherent limitations of voice recognition software  Read the chart carefully and recognize, using context, where substitutions have occurred

## 2019-03-22 NOTE — ASSESSMENT & PLAN NOTE
Lab Results   Component Value Date    [id number] 1 20 09/10/2016    TSH 0 46 04/26/2018     Well controlled on Synthroid 125mcg  Recheck TSH

## 2019-03-22 NOTE — ASSESSMENT & PLAN NOTE
Discussed with patient the risks of continued smoking  Discussed current use pattern- 0 5ppd>20 years  I advised patient to quit, and offered support  Pt expressed understanding, is pre-contemplative at this time  Asked patient to inform me when she sets a quit date  Offered assistance for whenever she is ready to quit  I spent approximately 6 minutes counseling the patient  Reviewed Chantix, Wellbutrin, nicotine replacement, hypnosis, pt not willing for any options at this time

## 2019-03-22 NOTE — ASSESSMENT & PLAN NOTE
Uses Xanax about 1x/month, usually when flying to [facility]  Will get baseline UDS and sign new CSA    Last fill per PDMP in 2018  Representatives have queried the patient's controlled substance dispensing history in the Prescription Drug Monitoring Program in compliance with regulations before I have prescribed any controlled substances  The prescription history is consistent with prescribed therapy and our practice policies

## 2019-03-22 NOTE — PROGRESS NOTES
[address] 40 y o  female   DATE: March 22, 2019     Assessment and Plan:  40 y o  female exam      1  Health Maintenance  - Colonoscopy? Start at age of 48, not at increased risk  - Pap? 2/2019 ([facility])  - Mammo? 3/2019 WNL- yearly  - Labs: FLP, BMP, TSH not yet obtained, she has lab slip  - Immunizations: Reviewed  Due for TdaP and flu, pt declined  Health Maintenance   Topic Date Due    BMI: Followup Plan  02/21/1993    Pneumococcal PPSV23 Medium Risk Adult (1 of 1 - PPSV23) 02/21/1994    DTaP,Tdap,and Td Vaccines (1 - Tdap) 02/21/1996    INFLUENZA VACCINE  03/22/2020 (Originally 7/1/2018)    MAMMOGRAM  03/05/2020    BMI: Adult  03/12/2020    PAP SMEAR  03/12/2024    HEPATITIS B VACCINES  Aged Out       2  Discussed the patient's BMI with her, (Body mass index is 32 95 kg/m²  )  Advised a healthy, balanced diet and regular exercise for 30 minutes 4-5 times a week  BMI Counseling: Body mass index is 32 95 kg/m²  Discussed the patient's BMI with her  The BMI is above average  BMI counseling and education was provided to the patient  Nutrition recommendations include reducing portion sizes, decreasing overall calorie intake, consuming healthier snacks and reducing intake of cholesterol  Exercise recommendations include moderate aerobic physical activity for 150 minutes/week and exercising 3-5 times per week  3  Patient Counseling: Patient given handout  --Nutrition: Stressed importance of moderation in sodium/caffeine intake, saturated fat and cholesterol, caloric balance, sufficient intake of fresh fruits, vegetables, fiber, calcium, iron, and 1 mg of folate supplement per day (for females capable of pregnancy)  --Exercise: Stressed the importance of regular exercise  --Substance Abuse: Discussed cessation/primary prevention of tobacco, alcohol, or other drug use; driving or other dangerous activities under the influence; availability of treatment for abuse  --Dental health: Discussed importance of regular tooth brushing, flossing, and dental visits  4  Other diagnoses addressed today:   No problem-specific Assessment & Plan notes found for this encounter  Follow up next physical in 1 year  Subjective:    Simona Acosta is a 40 y o  female and is here for a comprehensive physical exam    Active, exercises 3-5x/week, unhealthy diet  Histories Updated and Reviewed 3/22/2019:  Patient's Medications   New Prescriptions    No medications on file   Previous Medications    ALPRAZOLAM (XANAX) 0 5 MG TABLET    Take 1 tablet (0 5 mg total) by mouth 2 (two) times a day as needed for anxiety    CITALOPRAM (CELEXA) 10 MG TABLET    TAKE 1 TABLET DAILY      CLINDAMYCIN-BENZOYL PEROXIDE (BENZACLIN) GEL        LEVOTHYROXINE 125 MCG TABLET    take 1 tablet by mouth once daily   Modified Medications    No medications on file   Discontinued Medications    ALBUTEROL (PROAIR HFA) 90 MCG/ACT INHALER    Inhale 2 puffs every 6 (six) hours as needed for wheezing     No Known Allergies  Past Medical History:   Diagnosis Date    Depression     Disease of thyroid gland      Social History     Socioeconomic History    Marital status: /Civil Union     Spouse name: Not on file    Number of children: Not on file    Years of education: Not on file    Highest education level: Not on file   Occupational History    Not on file   Social Needs    Financial resource strain: Not on file    Food insecurity:     Worry: Not on file     Inability: Not on file    Transportation needs:     Medical: Not on file     Non-medical: Not on file   Tobacco Use    Smoking status: Current Every Day Smoker     Packs/day: 0 50     Years: 25 00     Pack years: 12 50     Types: Cigarettes    Smokeless tobacco: Never Used   Substance and Sexual Activity    Alcohol use: No    Drug use: No    Sexual activity: Yes     Partners: Male   Lifestyle    Physical activity:     Days per week: Not on file     Minutes per session: Not on file    Stress: Not on file   Relationships    Social connections:     Talks on phone: Not on file     Gets together: Not on file     Attends Episcopalian service: Not on file     Active member of club or organization: Not on file     Attends meetings of clubs or organizations: Not on file     Relationship status: Not on file    Intimate partner violence:     Fear of current or ex partner: Not on file     Emotionally abused: Not on file     Physically abused: Not on file     Forced sexual activity: Not on file   Other Topics Concern    Not on file   Social History Narrative    Not on file       There is no immunization history on file for this patient  Review of Systems:  Review of Systems   Constitutional: Negative for chills and fever  HENT: Negative for ear pain  Eyes: Negative for visual disturbance  Respiratory: Negative for cough and shortness of breath  Cardiovascular: Negative for chest pain and palpitations  Gastrointestinal: Negative for abdominal pain, diarrhea, nausea and vomiting  Musculoskeletal: Negative for arthralgias  Skin: Negative for rash  Neurological: Negative for headaches  Hematological: Does not bruise/bleed easily  Objective:  /60   Pulse 68   Temp (!) 96 8 °F (36 °C)   Resp 16   Ht 5' 3" (1 6 m)   Wt 84 4 kg (186 lb)   LMP 03/06/2019 (Approximate)   SpO2 98%   BMI 32 95 kg/m²   Physical Exam   Constitutional: She is oriented to person, place, and time  She appears well-developed and well-nourished  No distress  obese   HENT:   Head: Normocephalic and atraumatic  Mouth/Throat: Oropharynx is clear and moist  No oropharyngeal exudate  Eyes: Pupils are equal, round, and reactive to light  EOM are normal  Right eye exhibits no discharge  Left eye exhibits no discharge  Neck: Normal range of motion  Neck supple  No JVD present  Cardiovascular: Normal rate, regular rhythm and normal heart sounds     No murmur heard  Pulmonary/Chest: Effort normal and breath sounds normal  No stridor  No respiratory distress  She has no wheezes  Abdominal: Soft  Bowel sounds are normal  There is no tenderness  There is no rebound and no guarding  Musculoskeletal: Normal range of motion  She exhibits no edema or tenderness  Neurological: She is alert and oriented to person, place, and time  Skin: Skin is warm and dry  She is not diaphoretic  No erythema  Psychiatric: She has a normal mood and affect  Her behavior is normal    Vitals reviewed  Patient Care Team:  Marilou Muckle Marrian Severe, MD as PCP - General (Family Medicine)  Maretta Bradford, MD Marilou Muckle Marrian Severe, MD    Note: Portions of the record may have been created with voice recognition software  Occasional wrong word or "sound a like" substitutions may have occurred due to the inherent limitations of voice recognition software  Read the chart carefully and recognize, using context, where substitutions have occurred

## 2019-03-22 NOTE — ASSESSMENT & PLAN NOTE
Usually well controlled with Celexa 10mg, can consider Wellbutrin if patient agrees to try medications for smoking cessation

## 2019-03-25 LAB
6MAM UR QL: NEGATIVE NG/ML
AMPHETAMINES UR QL: NEGATIVE NG/ML
BARBITURATES UR QL: NEGATIVE NG/ML
BENZODIAZ UR QL: NEGATIVE NG/ML
BENZODIAZ UR SCN-MCNC: ABNORMAL NG/ML
BZE UR QL: NEGATIVE NG/ML
CREAT UR-MCNC: 76.2 MG/DL
ETHANOL UR QL: POSITIVE NG/ML
ETHYL GLUCURONIDE UR-MCNC: ABNORMAL NG/ML
ETHYL SULFATE UR-MCNC: 1970 NG/ML
METHADONE UR QL: NEGATIVE NG/ML
OPIATES UR QL: NEGATIVE NG/ML
OXIDANTS UR QL: NEGATIVE MCG/ML
OXYCODONE UR QL: NEGATIVE NG/ML
PCP UR QL: NEGATIVE NG/ML
PH UR: 6.51 [PH] (ref 4.5–9)
SL AMB MEDMATCH 6 ACETYLMORPHINE: ABNORMAL
SL AMB MEDMATCH AMPTHETAMINES: ABNORMAL
SL AMB MEDMATCH BARBITUATES: ABNORMAL
SL AMB MEDMATCH COCAINE METABOLITE: ABNORMAL
SL AMB MEDMATCH ETG: ABNORMAL
SL AMB MEDMATCH ETS: ABNORMAL
SL AMB MEDMATCH MARIJUANA METABOLITE: ABNORMAL
SL AMB MEDMATCH METHADONE METABOLITE: ABNORMAL
SL AMB MEDMATCH OPIATES: ABNORMAL
SL AMB MEDMATCH OXYCODONE: ABNORMAL
SL AMB MEDMATCH PHENCYCLIDINE: ABNORMAL
THC UR QL: NEGATIVE NG/ML

## 2019-04-24 ENCOUNTER — TELEPHONE (OUTPATIENT)
Dept: FAMILY MEDICINE CLINIC | Facility: CLINIC | Age: 44
End: 2019-04-24

## 2019-05-02 ENCOUNTER — TRANSCRIBE ORDERS (OUTPATIENT)
Dept: ADMINISTRATIVE | Age: 44
End: 2019-05-02

## 2019-05-02 ENCOUNTER — APPOINTMENT (OUTPATIENT)
Dept: LAB | Age: 44
End: 2019-05-02
Payer: COMMERCIAL

## 2019-05-02 ENCOUNTER — HOSPITAL ENCOUNTER (OUTPATIENT)
Dept: RADIOLOGY | Age: 44
Discharge: HOME | End: 2019-05-02
Payer: COMMERCIAL

## 2019-05-02 ENCOUNTER — TELEPHONE (OUTPATIENT)
Dept: FAMILY MEDICINE CLINIC | Facility: CLINIC | Age: 44
End: 2019-05-02

## 2019-05-02 DIAGNOSIS — F32.1 MODERATE SINGLE CURRENT EPISODE OF MAJOR DEPRESSIVE DISORDER (HCC): ICD-10-CM

## 2019-05-02 DIAGNOSIS — E03.9 ACQUIRED HYPOTHYROIDISM: ICD-10-CM

## 2019-05-02 DIAGNOSIS — R91.1 LUNG NODULE: ICD-10-CM

## 2019-05-02 DIAGNOSIS — E03.8 HYPOTHYROIDISM DUE TO HASHIMOTO'S THYROIDITIS: ICD-10-CM

## 2019-05-02 DIAGNOSIS — E78.1 PURE HYPERGLYCERIDEMIA: ICD-10-CM

## 2019-05-02 DIAGNOSIS — E06.3 HYPOTHYROIDISM DUE TO HASHIMOTO'S THYROIDITIS: ICD-10-CM

## 2019-05-02 LAB
ANION GAP SERPL CALCULATED.3IONS-SCNC: 3 MMOL/L (ref 4–13)
BASOPHILS # BLD AUTO: 0.07 THOUSANDS/ΜL (ref 0–0.1)
BASOPHILS NFR BLD AUTO: 1 % (ref 0–1)
BUN SERPL-MCNC: 10 MG/DL (ref 5–25)
CALCIUM SERPL-MCNC: 8.5 MG/DL (ref 8.3–10.1)
CHLORIDE SERPL-SCNC: 102 MMOL/L (ref 100–108)
CHOLEST SERPL-MCNC: 177 MG/DL (ref 50–200)
CO2 SERPL-SCNC: 28 MMOL/L (ref 21–32)
CREAT SERPL-MCNC: 0.89 MG/DL (ref 0.6–1.3)
EOSINOPHIL # BLD AUTO: 0.19 THOUSAND/ΜL (ref 0–0.61)
EOSINOPHIL NFR BLD AUTO: 2 % (ref 0–6)
ERYTHROCYTE [DISTWIDTH] IN BLOOD BY AUTOMATED COUNT: 14.1 % (ref 11.6–15.1)
GFR SERPL CREATININE-BSD FRML MDRD: 79 ML/MIN/1.73SQ M
GLUCOSE P FAST SERPL-MCNC: 86 MG/DL (ref 65–99)
HCT VFR BLD AUTO: 44.6 % (ref 34.8–46.1)
HDLC SERPL-MCNC: 48 MG/DL (ref 40–60)
HGB BLD-MCNC: 14.8 G/DL (ref 11.5–15.4)
IMM GRANULOCYTES # BLD AUTO: 0.03 THOUSAND/UL (ref 0–0.2)
IMM GRANULOCYTES NFR BLD AUTO: 0 % (ref 0–2)
LDLC SERPL CALC-MCNC: 94 MG/DL (ref 0–100)
LYMPHOCYTES # BLD AUTO: 1.91 THOUSANDS/ΜL (ref 0.6–4.47)
LYMPHOCYTES NFR BLD AUTO: 22 % (ref 14–44)
MCH RBC QN AUTO: 30.9 PG (ref 26.8–34.3)
MCHC RBC AUTO-ENTMCNC: 33.2 G/DL (ref 31.4–37.4)
MCV RBC AUTO: 93 FL (ref 82–98)
MONOCYTES # BLD AUTO: 0.65 THOUSAND/ΜL (ref 0.17–1.22)
MONOCYTES NFR BLD AUTO: 8 % (ref 4–12)
NEUTROPHILS # BLD AUTO: 5.73 THOUSANDS/ΜL (ref 1.85–7.62)
NEUTS SEG NFR BLD AUTO: 67 % (ref 43–75)
NRBC BLD AUTO-RTO: 0 /100 WBCS
PLATELET # BLD AUTO: 292 THOUSANDS/UL (ref 149–390)
PMV BLD AUTO: 10.7 FL (ref 8.9–12.7)
POTASSIUM SERPL-SCNC: 4 MMOL/L (ref 3.5–5.3)
RBC # BLD AUTO: 4.79 MILLION/UL (ref 3.81–5.12)
SODIUM SERPL-SCNC: 133 MMOL/L (ref 136–145)
TRIGL SERPL-MCNC: 177 MG/DL
TSH SERPL DL<=0.05 MIU/L-ACNC: 14.4 UIU/ML (ref 0.36–3.74)
WBC # BLD AUTO: 8.58 THOUSAND/UL (ref 4.31–10.16)

## 2019-05-02 PROCEDURE — 80048 BASIC METABOLIC PNL TOTAL CA: CPT

## 2019-05-02 PROCEDURE — 71260 CT THORAX DX C+: CPT

## 2019-05-02 PROCEDURE — 36415 COLL VENOUS BLD VENIPUNCTURE: CPT

## 2019-05-02 PROCEDURE — 84443 ASSAY THYROID STIM HORMONE: CPT

## 2019-05-02 PROCEDURE — 85025 COMPLETE CBC W/AUTO DIFF WBC: CPT

## 2019-05-02 PROCEDURE — 80061 LIPID PANEL: CPT

## 2019-05-02 RX ORDER — LEVOTHYROXINE SODIUM 137 UG/1
137 TABLET ORAL DAILY
Qty: 30 TABLET | Refills: 1 | Status: SHIPPED | OUTPATIENT
Start: 2019-05-02 | End: 2019-07-02 | Stop reason: SDUPTHER

## 2019-05-02 RX ADMIN — IOHEXOL 85 ML: 350 INJECTION, SOLUTION INTRAVENOUS at 09:12

## 2019-06-11 DIAGNOSIS — F32.A DEPRESSION, UNSPECIFIED DEPRESSION TYPE: ICD-10-CM

## 2019-06-11 RX ORDER — CITALOPRAM 10 MG/1
TABLET ORAL
Qty: 90 TABLET | Refills: 0 | Status: SHIPPED | OUTPATIENT
Start: 2019-06-11 | End: 2019-10-09 | Stop reason: SDUPTHER

## 2019-07-02 DIAGNOSIS — E06.3 HYPOTHYROIDISM DUE TO HASHIMOTO'S THYROIDITIS: ICD-10-CM

## 2019-07-02 DIAGNOSIS — E03.8 HYPOTHYROIDISM DUE TO HASHIMOTO'S THYROIDITIS: ICD-10-CM

## 2019-07-02 RX ORDER — LEVOTHYROXINE SODIUM 137 UG/1
137 TABLET ORAL DAILY
Qty: 30 TABLET | Refills: 0 | Status: SHIPPED | OUTPATIENT
Start: 2019-07-02 | End: 2020-03-05 | Stop reason: SDUPTHER

## 2019-07-02 NOTE — TELEPHONE ENCOUNTER
Her meds were adjusted in may, she needs to get repeat tsh lab test, the order is in, please have her do that to make sure it doesn't need adjusting again

## 2019-07-18 LAB — TSH SERPL-ACNC: 1.76 MIU/L

## 2019-08-21 ENCOUNTER — OFFICE VISIT (OUTPATIENT)
Dept: FAMILY MEDICINE CLINIC | Facility: CLINIC | Age: 44
End: 2019-08-21
Payer: COMMERCIAL

## 2019-08-21 VITALS — BODY MASS INDEX: 32.78 KG/M2 | WEIGHT: 185 LBS | HEIGHT: 63 IN | TEMPERATURE: 98.6 F

## 2019-08-21 DIAGNOSIS — L03.031 CELLULITIS OF TOE OF RIGHT FOOT: Primary | ICD-10-CM

## 2019-08-21 DIAGNOSIS — L08.9 INFLAMMATION OF TOE: ICD-10-CM

## 2019-08-21 PROCEDURE — 3008F BODY MASS INDEX DOCD: CPT | Performed by: NURSE PRACTITIONER

## 2019-08-21 PROCEDURE — 99213 OFFICE O/P EST LOW 20 MIN: CPT | Performed by: NURSE PRACTITIONER

## 2019-08-21 PROCEDURE — 4004F PT TOBACCO SCREEN RCVD TLK: CPT | Performed by: NURSE PRACTITIONER

## 2019-08-21 RX ORDER — METHYLPREDNISOLONE 4 MG/1
TABLET ORAL
Qty: 21 EACH | Refills: 0 | Status: SHIPPED | OUTPATIENT
Start: 2019-08-21 | End: 2020-05-15 | Stop reason: ALTCHOICE

## 2019-08-21 RX ORDER — CEPHALEXIN 500 MG/1
500 CAPSULE ORAL 3 TIMES DAILY
Qty: 21 CAPSULE | Refills: 0 | Status: SHIPPED | OUTPATIENT
Start: 2019-08-21 | End: 2019-08-28

## 2019-08-21 NOTE — PROGRESS NOTES
Assessment/Plan:     Diagnoses and all orders for this visit:    Cellulitis of toe of right foot  -     cephalexin (KEFLEX) 500 mg capsule; Take 1 capsule (500 mg total) by mouth 3 (three) times a day for 7 days    Inflammation of toe  -     methylPREDNISolone 4 MG tablet therapy pack; Use as directed on package        Discussed with patient plan to treat with 7 day course of cephalexin and a medrol dose pack  Discussed with patient conservative measures to treat: elevate, rest and ice application  Patient instructed to call if no improvement in 72 hours or symptoms worsen    Subjective:      Patient ID: Juventino Camargo is a 40 y o  female  40 y  o female presenting with swollen right second toe for the past day  She reports that she was sitting at the pool yesterday  She does not recall being bite by any type of insect but this morning when she woke up the toe was swollen and itchy  She placed some hydrocortisone cream on it but did not do much for it         Family History   Problem Relation Age of Onset   Abraham Breast cancer Mother 71    Hypertension Mother     Diabetes Father     Heart disease Father      Social History     Socioeconomic History    Marital status: /Civil Union     Spouse name: Not on file    Number of children: Not on file    Years of education: Not on file    Highest education level: Not on file   Occupational History    Not on file   Social Needs    Financial resource strain: Not on file    Food insecurity:     Worry: Not on file     Inability: Not on file    Transportation needs:     Medical: Not on file     Non-medical: Not on file   Tobacco Use    Smoking status: Current Every Day Smoker     Packs/day: 0 50     Years: 25 00     Pack years: 12 50     Types: Cigarettes    Smokeless tobacco: Never Used   Substance and Sexual Activity    Alcohol use: No    Drug use: No    Sexual activity: Yes     Partners: Male   Lifestyle    Physical activity:     Days per week: Not on file     Minutes per session: Not on file    Stress: Not on file   Relationships    Social connections:     Talks on phone: Not on file     Gets together: Not on file     Attends Faith service: Not on file     Active member of club or organization: Not on file     Attends meetings of clubs or organizations: Not on file     Relationship status: Not on file    Intimate partner violence:     Fear of current or ex partner: Not on file     Emotionally abused: Not on file     Physically abused: Not on file     Forced sexual activity: Not on file   Other Topics Concern    Not on file   Social History Narrative    Not on file     Past Medical History:   Diagnosis Date    Depression     Disease of thyroid gland      No past surgical history on file  No Known Allergies    Current Outpatient Medications:     citalopram (CeleXA) 10 mg tablet, take 1 tablet by mouth once daily, Disp: 90 tablet, Rfl: 0    levothyroxine 137 mcg tablet, Take 1 tablet (137 mcg total) by mouth daily, Disp: 30 tablet, Rfl: 0    cephalexin (KEFLEX) 500 mg capsule, Take 1 capsule (500 mg total) by mouth 3 (three) times a day for 7 days, Disp: 21 capsule, Rfl: 0    clindamycin-benzoyl peroxide (BENZACLIN) gel, , Disp: , Rfl: 0    methylPREDNISolone 4 MG tablet therapy pack, Use as directed on package, Disp: 21 each, Rfl: 0    Review of Systems   Constitutional: Negative  Respiratory: Negative  Cardiovascular: Negative  Musculoskeletal: Positive for joint swelling  Skin: Positive for color change  Neurological: Negative  Psychiatric/Behavioral: Negative  Objective:    Temp 98 6 °F (37 °C)   Ht 5' 3" (1 6 m)   Wt 83 9 kg (185 lb)   BMI 32 77 kg/m² (Reviewed)     Physical Exam   Constitutional: She is oriented to person, place, and time  Vital signs are normal  She appears well-developed and well-nourished  HENT:   Head: Normocephalic and atraumatic  Eyes: Pupils are equal, round, and reactive to light  Conjunctivae, EOM and lids are normal    Neck: Trachea normal  Neck supple  Pulmonary/Chest: Effort normal and breath sounds normal    Musculoskeletal:        Feet:    Neurological: She is alert and oriented to person, place, and time  She has normal strength  Skin: Skin is warm and dry  Capillary refill takes less than 2 seconds  There is erythema  Psychiatric: She has a normal mood and affect   Her behavior is normal

## 2019-10-09 DIAGNOSIS — F32.A DEPRESSION, UNSPECIFIED DEPRESSION TYPE: ICD-10-CM

## 2019-10-09 RX ORDER — CITALOPRAM 10 MG/1
10 TABLET ORAL DAILY
Qty: 90 TABLET | Refills: 2 | Status: SHIPPED | OUTPATIENT
Start: 2019-10-09 | End: 2020-05-15 | Stop reason: SDUPTHER

## 2019-10-09 NOTE — TELEPHONE ENCOUNTER
Patient called requesting refill on Citalopram 10 mg 90 day supply with refills [facility]

## 2020-03-05 DIAGNOSIS — E06.3 HYPOTHYROIDISM DUE TO HASHIMOTO'S THYROIDITIS: ICD-10-CM

## 2020-03-05 DIAGNOSIS — E03.8 HYPOTHYROIDISM DUE TO HASHIMOTO'S THYROIDITIS: ICD-10-CM

## 2020-03-05 RX ORDER — LEVOTHYROXINE SODIUM 137 UG/1
137 TABLET ORAL DAILY
Qty: 30 TABLET | Refills: 0 | Status: SHIPPED | OUTPATIENT
Start: 2020-03-05 | End: 2020-04-21 | Stop reason: SDUPTHER

## 2020-03-06 ENCOUNTER — APPOINTMENT (OUTPATIENT)
Dept: LAB | Age: 45
End: 2020-03-06
Payer: COMMERCIAL

## 2020-03-06 ENCOUNTER — HOSPITAL ENCOUNTER (OUTPATIENT)
Dept: RADIOLOGY | Age: 45
Discharge: HOME | End: 2020-03-06
Payer: COMMERCIAL

## 2020-03-06 VITALS — HEIGHT: 63 IN | WEIGHT: 185 LBS | BODY MASS INDEX: 32.78 KG/M2

## 2020-03-06 DIAGNOSIS — E06.3 HYPOTHYROIDISM DUE TO HASHIMOTO'S THYROIDITIS: ICD-10-CM

## 2020-03-06 DIAGNOSIS — E03.8 HYPOTHYROIDISM DUE TO HASHIMOTO'S THYROIDITIS: ICD-10-CM

## 2020-03-06 DIAGNOSIS — Z12.31 ENCOUNTER FOR SCREENING MAMMOGRAM FOR MALIGNANT NEOPLASM OF BREAST: ICD-10-CM

## 2020-03-06 LAB — TSH SERPL DL<=0.05 MIU/L-ACNC: 3.39 UIU/ML (ref 0.36–3.74)

## 2020-03-06 PROCEDURE — 77063 BREAST TOMOSYNTHESIS BI: CPT

## 2020-03-06 PROCEDURE — 84443 ASSAY THYROID STIM HORMONE: CPT

## 2020-03-06 PROCEDURE — 77067 SCR MAMMO BI INCL CAD: CPT

## 2020-03-06 PROCEDURE — 36415 COLL VENOUS BLD VENIPUNCTURE: CPT

## 2020-04-10 ENCOUNTER — TELEPHONE (OUTPATIENT)
Dept: FAMILY MEDICINE CLINIC | Facility: CLINIC | Age: 45
End: 2020-04-10

## 2020-04-21 DIAGNOSIS — E06.3 HYPOTHYROIDISM DUE TO HASHIMOTO'S THYROIDITIS: ICD-10-CM

## 2020-04-21 DIAGNOSIS — E03.8 HYPOTHYROIDISM DUE TO HASHIMOTO'S THYROIDITIS: ICD-10-CM

## 2020-04-21 RX ORDER — LEVOTHYROXINE SODIUM 137 UG/1
137 TABLET ORAL DAILY
Qty: 30 TABLET | Refills: 0 | Status: SHIPPED | OUTPATIENT
Start: 2020-04-21 | End: 2020-05-15 | Stop reason: SDUPTHER

## 2020-04-28 ENCOUNTER — TELEPHONE (OUTPATIENT)
Dept: FAMILY MEDICINE CLINIC | Facility: CLINIC | Age: 45
End: 2020-04-28

## 2020-05-15 ENCOUNTER — OFFICE VISIT (OUTPATIENT)
Dept: FAMILY MEDICINE CLINIC | Facility: CLINIC | Age: 45
End: 2020-05-15
Payer: COMMERCIAL

## 2020-05-15 VITALS
WEIGHT: 183 LBS | SYSTOLIC BLOOD PRESSURE: 102 MMHG | DIASTOLIC BLOOD PRESSURE: 60 MMHG | HEART RATE: 88 BPM | BODY MASS INDEX: 32.43 KG/M2 | RESPIRATION RATE: 16 BRPM | HEIGHT: 63 IN | TEMPERATURE: 97.7 F

## 2020-05-15 DIAGNOSIS — E03.9 ACQUIRED HYPOTHYROIDISM: Primary | ICD-10-CM

## 2020-05-15 DIAGNOSIS — Z00.00 ROUTINE GENERAL MEDICAL EXAMINATION AT A HEALTH CARE FACILITY: ICD-10-CM

## 2020-05-15 DIAGNOSIS — F32.A DEPRESSION, UNSPECIFIED DEPRESSION TYPE: ICD-10-CM

## 2020-05-15 DIAGNOSIS — E06.3 HYPOTHYROIDISM DUE TO HASHIMOTO'S THYROIDITIS: ICD-10-CM

## 2020-05-15 DIAGNOSIS — F32.1 MODERATE SINGLE CURRENT EPISODE OF MAJOR DEPRESSIVE DISORDER (HCC): ICD-10-CM

## 2020-05-15 DIAGNOSIS — Z71.6 TOBACCO ABUSE COUNSELING: ICD-10-CM

## 2020-05-15 DIAGNOSIS — Z00.00 ANNUAL PHYSICAL EXAM: ICD-10-CM

## 2020-05-15 DIAGNOSIS — E03.8 HYPOTHYROIDISM DUE TO HASHIMOTO'S THYROIDITIS: ICD-10-CM

## 2020-05-15 PROCEDURE — 99396 PREV VISIT EST AGE 40-64: CPT | Performed by: PHYSICIAN ASSISTANT

## 2020-05-15 PROCEDURE — 3008F BODY MASS INDEX DOCD: CPT | Performed by: PHYSICIAN ASSISTANT

## 2020-05-15 RX ORDER — MELATONIN 10 MG
10 CAPSULE ORAL
COMMUNITY

## 2020-05-15 RX ORDER — CITALOPRAM 10 MG/1
10 TABLET ORAL DAILY
Qty: 90 TABLET | Refills: 3 | Status: SHIPPED | OUTPATIENT
Start: 2020-05-15 | End: 2021-05-19

## 2020-05-15 RX ORDER — LEVOTHYROXINE SODIUM 137 UG/1
137 TABLET ORAL DAILY
Qty: 90 TABLET | Refills: 3 | Status: SHIPPED | OUTPATIENT
Start: 2020-05-15 | End: 2021-05-19

## 2021-01-04 ENCOUNTER — TELEPHONE (OUTPATIENT)
Dept: OBGYN CLINIC | Facility: CLINIC | Age: 46
End: 2021-01-04

## 2021-03-08 ENCOUNTER — HOSPITAL ENCOUNTER (OUTPATIENT)
Dept: RADIOLOGY | Age: 46
Discharge: HOME | End: 2021-03-08
Payer: COMMERCIAL

## 2021-03-08 VITALS — HEIGHT: 63 IN | BODY MASS INDEX: 32.78 KG/M2 | WEIGHT: 185 LBS

## 2021-03-08 DIAGNOSIS — Z12.31 ENCOUNTER FOR SCREENING MAMMOGRAM FOR MALIGNANT NEOPLASM OF BREAST: ICD-10-CM

## 2021-03-08 PROCEDURE — 77067 SCR MAMMO BI INCL CAD: CPT

## 2021-03-08 PROCEDURE — 77063 BREAST TOMOSYNTHESIS BI: CPT

## 2021-03-30 NOTE — PROGRESS NOTES
Assessment/Plan   Diagnoses and all orders for this visit:    Well woman exam    Encounter for screening mammogram for malignant neoplasm of breast        Discussion    All questions have been answered to her satisfaction  RTO for APE or sooner if needed      Subjective     HPI   Shantal Fonseca is a 55 y o  female who presents for annual well woman exam    LMP -3/17/21 Cycles regular without complaints  No vulvar itch/burn; No vaginal itch/burn; No abn discharge or odor;    Pt does c/o incontinence  MORRIS is most often  Advised timed bladder emptying, pelvic floor exercises and PT and avoidance of bladder irritants  (+) SBEs - no breast masses, asymmetry, nipple discharge or bleeding, changes in skin of breast, or breast tenderness bilaterally    No abd/pelvic pain or HAs; No menopausal symptoms: No hot flashes/night sweats, problems with intercourse, vaginal dryness; sleeping well  Pt is sexually active in a mutually monog/ sexual relationship; No issues with intercourse; She declines std/hiv/hep testing; Feels safe at home      (+) PCP for routine Bw/care; Dr Rohan Rivas    Last Pap - 3/12/19 WNL neg HPV  History of abnormal Pap smear: denies  Last mammo - 3/8/21 [facility]  History of abnormal mammogram: denies   Last colonoscopy - declines at this point      Review of Systems   Constitutional: Negative  Respiratory: Negative  Gastrointestinal: Negative  Endocrine: Negative  Genitourinary: Negative          The following portions of the patient's history were reviewed and updated as appropriate: allergies, current medications, past family history, past medical history, past social history, past surgical history and problem list          OB History        2    Para   2    Term   2            AB        Living   2       SAB        TAB        Ectopic        Multiple        Live Births   2                 Past Medical History:   Diagnosis Date    Depression     Disease of thyroid gland        History reviewed  No pertinent surgical history      Family History   Problem Relation Age of Onset   Doreen Hitchcock Breast cancer Mother 71    Hypertension Mother     Diabetes Father     Heart disease Father     No Known Problems Sister     No Known Problems Daughter     Breast cancer Maternal Grandmother         unknown age   Doreen Hitchcock Stomach cancer Maternal Grandfather         unknown age   Doreen Hitchcock No Known Problems Paternal Grandmother     No Known Problems Paternal Grandfather     No Known Problems Daughter     No Known Problems Sister     No Known Problems Maternal Aunt        Social History     Socioeconomic History    Marital status: /Civil Union     Spouse name: Not on file    Number of children: Not on file    Years of education: Not on file    Highest education level: Not on file   Occupational History    Not on file   Social Needs    Financial resource strain: Not on file    Food insecurity     Worry: Not on file     Inability: Not on file    Transportation needs     Medical: Not on file     Non-medical: Not on file   Tobacco Use    Smoking status: Current Every Day Smoker     Packs/day: 0 50     Years: 27 00     Pack years: 13 50     Types: Cigarettes    Smokeless tobacco: Never Used   Substance and Sexual Activity    Alcohol use: No    Drug use: No    Sexual activity: Yes     Partners: Male   Lifestyle    Physical activity     Days per week: Not on file     Minutes per session: Not on file    Stress: Not on file   Relationships    Social connections     Talks on phone: Not on file     Gets together: Not on file     Attends Gnosticist service: Not on file     Active member of club or organization: Not on file     Attends meetings of clubs or organizations: Not on file     Relationship status: Not on file    Intimate partner violence     Fear of current or ex partner: Not on file     Emotionally abused: Not on file     Physically abused: Not on file     Forced sexual activity: Not on file   Other Topics Concern    Not on file   Social History Narrative    Not on file         Current Outpatient Medications:     citalopram (CeleXA) 10 mg tablet, Take 1 tablet (10 mg total) by mouth daily, Disp: 90 tablet, Rfl: 3    levothyroxine 137 mcg tablet, Take 1 tablet (137 mcg total) by mouth daily, Disp: 90 tablet, Rfl: 3    Melatonin 10 MG CAPS, Take 10 mg by mouth daily at bedtime, Disp: , Rfl:     No Known Allergies    Objective   Vitals:    03/31/21 0811   BP: 122/74   BP Location: Left arm   Patient Position: Sitting   Cuff Size: Large   Weight: 86 5 kg (190 lb 12 8 oz)   Height: 5' 3" (1 6 m)     Physical Exam  Constitutional:       Appearance: She is well-developed  HENT:      Head: Normocephalic and atraumatic  Cardiovascular:      Rate and Rhythm: Normal rate and regular rhythm  Pulmonary:      Effort: Pulmonary effort is normal       Breath sounds: Normal breath sounds  Chest:      Breasts: Breasts are symmetrical          Right: No inverted nipple, mass, nipple discharge, skin change or tenderness  Left: No inverted nipple, mass, nipple discharge, skin change or tenderness  Abdominal:      General: There is no distension  Palpations: Abdomen is soft  There is no mass  Tenderness: There is no guarding or rebound  Genitourinary:     Exam position: Supine  Labia:         Right: No rash  Left: No rash  Vagina: No signs of injury and foreign body  No vaginal discharge or erythema  Cervix: No cervical motion tenderness  Adnexa:         Right: No mass  Left: No mass  Skin:     General: Skin is warm and dry  Neurological:      Mental Status: She is alert and oriented to person, place, and time  Patient Instructions   Pap deferred due to guidelines  Mammo already scheduled for next year  Pt declines colonoscopy     Advised options to do kegels, plan PT, timed bladder emptying, etc    Pt will consider options and let us know if she wants PT referral or to see uro gyn

## 2021-03-31 ENCOUNTER — ANNUAL EXAM (OUTPATIENT)
Dept: OBGYN CLINIC | Facility: CLINIC | Age: 46
End: 2021-03-31
Payer: COMMERCIAL

## 2021-03-31 VITALS
BODY MASS INDEX: 33.81 KG/M2 | DIASTOLIC BLOOD PRESSURE: 74 MMHG | HEIGHT: 63 IN | SYSTOLIC BLOOD PRESSURE: 122 MMHG | WEIGHT: 190.8 LBS

## 2021-03-31 DIAGNOSIS — Z01.419 WELL WOMAN EXAM: Primary | ICD-10-CM

## 2021-03-31 DIAGNOSIS — Z12.31 ENCOUNTER FOR SCREENING MAMMOGRAM FOR MALIGNANT NEOPLASM OF BREAST: ICD-10-CM

## 2021-03-31 PROCEDURE — 99396 PREV VISIT EST AGE 40-64: CPT | Performed by: PHYSICIAN ASSISTANT

## 2021-03-31 NOTE — PATIENT INSTRUCTIONS
Pap deferred due to guidelines  Mammo already scheduled for next year  Pt declines colonoscopy  Advised options to do kegels, plan PT, timed bladder emptying, etc    Pt will consider options and let us know if she wants PT referral or to see uro gyn

## 2021-05-18 DIAGNOSIS — F32.A DEPRESSION, UNSPECIFIED DEPRESSION TYPE: ICD-10-CM

## 2021-05-19 DIAGNOSIS — E06.3 HYPOTHYROIDISM DUE TO HASHIMOTO'S THYROIDITIS: ICD-10-CM

## 2021-05-19 DIAGNOSIS — E03.8 HYPOTHYROIDISM DUE TO HASHIMOTO'S THYROIDITIS: ICD-10-CM

## 2021-05-19 RX ORDER — LEVOTHYROXINE SODIUM 137 UG/1
TABLET ORAL
Qty: 90 TABLET | Refills: 0 | Status: SHIPPED | OUTPATIENT
Start: 2021-05-19 | End: 2021-08-18 | Stop reason: SDUPTHER

## 2021-05-19 RX ORDER — CITALOPRAM 10 MG/1
TABLET ORAL
Qty: 90 TABLET | Refills: 0 | Status: SHIPPED | OUTPATIENT
Start: 2021-05-19 | End: 2021-08-18 | Stop reason: SDUPTHER

## 2021-06-07 PROBLEM — E78.2 MODERATE MIXED HYPERLIPIDEMIA NOT REQUIRING STATIN THERAPY: Status: ACTIVE | Noted: 2021-06-07

## 2021-06-07 PROBLEM — Z72.0 TOBACCO ABUSE: Status: ACTIVE | Noted: 2019-03-22

## 2021-08-13 ENCOUNTER — TELEPHONE (OUTPATIENT)
Dept: FAMILY MEDICINE CLINIC | Facility: CLINIC | Age: 46
End: 2021-08-13

## 2021-08-13 DIAGNOSIS — E03.8 HYPOTHYROIDISM DUE TO HASHIMOTO'S THYROIDITIS: Primary | ICD-10-CM

## 2021-08-13 DIAGNOSIS — E06.3 HYPOTHYROIDISM DUE TO HASHIMOTO'S THYROIDITIS: Primary | ICD-10-CM

## 2021-08-13 DIAGNOSIS — Z13.1 SCREENING FOR DIABETES MELLITUS (DM): ICD-10-CM

## 2021-08-13 DIAGNOSIS — E78.2 MODERATE MIXED HYPERLIPIDEMIA NOT REQUIRING STATIN THERAPY: ICD-10-CM

## 2021-08-20 ENCOUNTER — APPOINTMENT (OUTPATIENT)
Dept: LAB | Facility: CLINIC | Age: 46
End: 2021-08-20
Payer: COMMERCIAL

## 2021-08-20 DIAGNOSIS — E06.3 HYPOTHYROIDISM DUE TO HASHIMOTO'S THYROIDITIS: ICD-10-CM

## 2021-08-20 DIAGNOSIS — E78.2 MODERATE MIXED HYPERLIPIDEMIA NOT REQUIRING STATIN THERAPY: ICD-10-CM

## 2021-08-20 DIAGNOSIS — Z13.1 SCREENING FOR DIABETES MELLITUS (DM): ICD-10-CM

## 2021-08-20 DIAGNOSIS — E03.8 HYPOTHYROIDISM DUE TO HASHIMOTO'S THYROIDITIS: ICD-10-CM

## 2021-08-20 LAB
ALBUMIN SERPL BCP-MCNC: 3.2 G/DL (ref 3.5–5)
ALP SERPL-CCNC: 91 U/L (ref 46–116)
ALT SERPL W P-5'-P-CCNC: 35 U/L (ref 12–78)
ANION GAP SERPL CALCULATED.3IONS-SCNC: 2 MMOL/L (ref 4–13)
AST SERPL W P-5'-P-CCNC: 19 U/L (ref 5–45)
BILIRUB SERPL-MCNC: 0.26 MG/DL (ref 0.2–1)
BUN SERPL-MCNC: 12 MG/DL (ref 5–25)
CALCIUM ALBUM COR SERPL-MCNC: 8.7 MG/DL (ref 8.3–10.1)
CALCIUM SERPL-MCNC: 8.1 MG/DL (ref 8.3–10.1)
CHLORIDE SERPL-SCNC: 108 MMOL/L (ref 100–108)
CHOLEST SERPL-MCNC: 171 MG/DL (ref 50–200)
CO2 SERPL-SCNC: 27 MMOL/L (ref 21–32)
CREAT SERPL-MCNC: 0.71 MG/DL (ref 0.6–1.3)
GFR SERPL CREATININE-BSD FRML MDRD: 102 ML/MIN/1.73SQ M
GLUCOSE P FAST SERPL-MCNC: 80 MG/DL (ref 65–99)
HDLC SERPL-MCNC: 43 MG/DL
LDLC SERPL CALC-MCNC: 103 MG/DL (ref 0–100)
NONHDLC SERPL-MCNC: 128 MG/DL
POTASSIUM SERPL-SCNC: 4.3 MMOL/L (ref 3.5–5.3)
PROT SERPL-MCNC: 7.2 G/DL (ref 6.4–8.2)
SODIUM SERPL-SCNC: 137 MMOL/L (ref 136–145)
TRIGL SERPL-MCNC: 126 MG/DL
TSH SERPL DL<=0.05 MIU/L-ACNC: 0.98 UIU/ML (ref 0.36–3.74)

## 2021-08-20 PROCEDURE — 84443 ASSAY THYROID STIM HORMONE: CPT

## 2021-08-20 PROCEDURE — 36415 COLL VENOUS BLD VENIPUNCTURE: CPT

## 2021-08-20 PROCEDURE — 80053 COMPREHEN METABOLIC PANEL: CPT

## 2021-08-20 PROCEDURE — 80061 LIPID PANEL: CPT

## 2021-08-24 NOTE — PROGRESS NOTES
[facility]      Date of Service: 21  Primary Care Provider:   Siobhan Salguero MD     Name: Natalie Sahu       : 1975       Age:46 y o  Sex: female      MRN: [id number]      Chief Complaint:Annual Exam (Yearly physical) and Eye Problem (rash on both eyes)       Assessment and Plan:  55 y o  female exam      1  Health Maintenance  - Colon Cancer Screening: average risk start at 50  - Cervical Cancer Screening: follows GYN  - Breast Cancer Screening: scheduled   - Labs: done  Previously   - Immunizations: Reviewed  Recommend yearly flu vaccine  2  Other diagnoses addressed today:   Problem List Items Addressed This Visit        Endocrine    Acquired hypothyroidism     Lab Results   Component Value Date    [id number] 0 980 2021    TSH 1 76 2019     Controlled, continue levothyroxine 137 mcg daily         Relevant Medications    levothyroxine 137 mcg tablet       Musculoskeletal and Integument    Dermatitis     Treat with Desonide cream         Relevant Medications    desonide (DESOWEN) 0 05 % cream       Other    Moderate single current episode of major depressive disorder ([facility] [address] )     Patient reports stable mood, she is wondering if she can come off of Celexa  Reviewed taper instructions for patient and possible adverse effects of decreasing dose  Discussed that symptoms will typically resolve on their own with time  Relevant Medications    citalopram (CeleXA) 10 mg tablet    BMI 32 0-32 9,adult    Lung nodule     Due for repeat CT scan given history of smoking cessation  Relevant Orders    CT lung nodule follow-up    Tobacco abuse     Tobacco Cessation Counseling: Tobacco cessation counseling and education was provided  The patient is sincerely urged to quit consumption of tobacco  She is ready to quit tobacco  The numerous health risks of tobacco consumption were discussed   If she decides to quit, there are a number of helpful adjunctive aids, and she can see me to discuss nicotine replacement therapy, chantix, or bupropion anytime in the future  Counseled on behavioral changes, recommended consideration of nicotine replacement therapy, calling QUIT line  Moderate mixed hyperlipidemia not requiring statin therapy     Counseled on healthy eating, regular exercise         Relevant Medications    citalopram (CeleXA) 10 mg tablet      Other Visit Diagnoses     Annual physical exam    -  Primary    Hypothyroidism due to Hashimoto's thyroiditis        Relevant Medications    levothyroxine 137 mcg tablet           Immunizations and preventive care screenings were discussed with patient today  Appropriate education was printed on patient's after visit summary  Counseling:  · Alcohol/drug use: discussed moderation in alcohol intake, the recommendations for healthy alcohol use, and avoidance of illicit drug use  · Dental Health: discussed importance of regular tooth brushing, flossing, and dental visits  · Injury prevention: discussed safety/seat belts, safety helmets, smoke detectors, carbon dioxide detectors, and smoking near bedding or upholstery  · Exercise: the importance of regular exercise/physical activity was discussed  Recommend exercise 3-5 times per week for at least 30 minutes  BMI Counseling: Body mass index is 32 87 kg/m²  The BMI is above normal  Nutrition recommendations include encouraging healthy choices of fruits and vegetables, consuming healthier snacks and reducing intake of saturated and trans fat  Exercise recommendations include exercising 3-5 times per week  Tobacco Cessation Counseling: Tobacco cessation counseling was provided  The patient is sincerely urged to quit consumption of tobacco  She is ready to quit tobacco        Follow up next physical in 1 year      Subjective:    David Florian is a 55 y o  female and is here for a comprehensive physical exam      Acute Complaints:     She reports rash around her eyes  She denies new personal care products  She is wondering about coming off of Celexa  She was started on this 5 years ago when her father   She has been smoking since age 25  She typically smoke 1/2 pack a day  She wants to try to quit in the fall  She does drink more alcohol in the summer time, she will have 6 or more drinks on one occasion while at the beach  She does feel like she should cut back on alcohol use at times  Diet and Physical Activity  · Diet/Nutrition: does follow a well balanced diet  · Exercise: no formal exercise  General Health  · Vision: no vision problems and goes for regular eye exams  · Dental: regular dental visits  Gyn Health: Follows with GYN     Patient's last menstrual period was 08/10/2021 (approximate)    History of abnormal pap smears  No          Histories Updated and Reviewed 2021:  Patient's Medications   New Prescriptions    DESONIDE (DESOWEN) 0 05 % CREAM    Apply topically 2 (two) times a day   Previous Medications    MELATONIN 10 MG CAPS    Take 10 mg by mouth daily at bedtime   Modified Medications    Modified Medication Previous Medication    CITALOPRAM (CELEXA) 10 MG TABLET citalopram (CeleXA) 10 mg tablet       Take 0 5-1 tablets (5-10 mg total) by mouth daily    Take 1 tablet (10 mg total) by mouth daily    LEVOTHYROXINE 137 MCG TABLET levothyroxine 137 mcg tablet       Take 1 tablet (137 mcg total) by mouth daily    Take 1 tablet (137 mcg total) by mouth daily   Discontinued Medications    No medications on file     No Known Allergies  Past Medical History:   Diagnosis Date    Depression     Disease of thyroid gland      Social History     Socioeconomic History    Marital status: /Civil Union     Spouse name: Not on file    Number of children: Not on file    Years of education: Not on file    Highest education level: Not on file   Occupational History    Not on file   Tobacco Use    Smoking status: Current Every Day Smoker     Packs/day: 0 50     Years: 28 00     Pack years: 14 00     Types: Cigarettes    Smokeless tobacco: Never Used   Vaping Use    Vaping Use: Never used   Substance and Sexual Activity    Alcohol use: Yes     Alcohol/week: 2 0 standard drinks     Types: 1 Glasses of wine, 1 Cans of beer per week     Comment: socially    Drug use: No    Sexual activity: Yes     Partners: Male     Birth control/protection: None   Other Topics Concern    Not on file   Social History Narrative    Not on file     Social Determinants of Health     Financial Resource Strain:     Difficulty of Paying Living Expenses:    Food Insecurity:     Worried About Running Out of Food in the Last Year:     Ran Out of Food in the Last Year:    Transportation Needs:     Lack of Transportation (Medical):  Lack of Transportation (Non-Medical):    Physical Activity:     Days of Exercise per Week:     Minutes of Exercise per Session:    Stress:     Feeling of Stress :    Social Connections:     Frequency of Communication with Friends and Family:     Frequency of Social Gatherings with Friends and Family:     Attends Sabianism Services:     Active Member of Clubs or Organizations:     Attends Club or Organization Meetings:     Marital Status:    Intimate Partner Violence:     Fear of Current or Ex-Partner:     Emotionally Abused:     Physically Abused:     Sexually Abused: There is no immunization history on file for this patient      Objective:  /80 (BP Location: Left arm, Patient Position: Sitting, Cuff Size: Large)   Pulse 66   Temp 97 7 °F (36 5 °C)   Resp 18   Ht 5' 3 5" (1 613 m)   Wt 85 5 kg (188 lb 8 oz)   LMP 08/10/2021 (Approximate)   SpO2 97%   Breastfeeding No   BMI 32 87 kg/m²   BP Readings from Last 3 Encounters:   08/25/21 108/80   03/31/21 122/74   05/15/20 102/60      Wt Readings from Last 3 Encounters:   08/25/21 85 5 kg (188 lb 8 oz)   03/31/21 86 5 kg (190 lb 12 8 oz)   03/08/21 83 9 kg (185 lb)      Physical Exam  Constitutional:       General: She is not in acute distress  Appearance: Normal appearance  She is obese  She is not ill-appearing or toxic-appearing  HENT:      Head: Normocephalic and atraumatic  Right Ear: Tympanic membrane, ear canal and external ear normal       Left Ear: Tympanic membrane, ear canal and external ear normal       Nose: Nose normal       Mouth/Throat:      Mouth: Mucous membranes are moist    Eyes:      Extraocular Movements: Extraocular movements intact  Conjunctiva/sclera: Conjunctivae normal    Cardiovascular:      Rate and Rhythm: Normal rate and regular rhythm  Pulses: Normal pulses  Heart sounds: Normal heart sounds  No murmur heard  No friction rub  Pulmonary:      Effort: Pulmonary effort is normal  No respiratory distress  Breath sounds: Normal breath sounds  Musculoskeletal:         General: Normal range of motion  Cervical back: Normal range of motion and neck supple  No rigidity  Right lower leg: No edema  Left lower leg: No edema  Lymphadenopathy:      Cervical: No cervical adenopathy  Skin:     General: Skin is warm and dry  Findings: Rash (papular rash on face) present  No erythema  Neurological:      General: No focal deficit present  Mental Status: She is alert and oriented to person, place, and time     Psychiatric:         Mood and Affect: Mood normal          Behavior: Behavior normal          Lab Results   Component Value Date    SODIUM 137 08/20/2021    K 4 3 08/20/2021     08/20/2021    CO2 27 08/20/2021    BUN 12 08/20/2021    CREATININE 0 71 08/20/2021    GLUC 86 04/26/2018    CALCIUM 8 1 (L) 08/20/2021     Lab Results   Component Value Date    ALT 35 08/20/2021    AST 19 08/20/2021    ALKPHOS 91 08/20/2021     Lab Results   Component Value Date    CHOLESTEROL 171 08/20/2021    CHOLESTEROL 177 05/02/2019    CHOLESTEROL 129 04/26/2018     Lab Results   Component Value Date    HDL 43 08/20/2021    HDL 48 05/02/2019    HDL 46 (L) 04/26/2018     Lab Results   Component Value Date    TRIG 126 08/20/2021    TRIG 177 (H) 05/02/2019    TRIG 122 04/26/2018     Lab Results   Component Value Date    [place] 128 08/20/2021     Lab Results   Component Value Date    LDLCALC 103 (H) 08/20/2021     Lab Results   Component Value Date    [id number] 0 980 08/20/2021    TSH 1 76 07/17/2019       Patient Care Team:  Diego Melendez MD as PCP - General (Family Medicine)  MD Diego Hills MD    Note: Portions of the record may have been created with voice recognition software  Occasional wrong word or "sound a like" substitutions may have occurred due to the inherent limitations of voice recognition software  Read the chart carefully and recognize, using context, where substitutions have occurred

## 2021-08-25 ENCOUNTER — OFFICE VISIT (OUTPATIENT)
Dept: FAMILY MEDICINE CLINIC | Facility: CLINIC | Age: 46
End: 2021-08-25
Payer: COMMERCIAL

## 2021-08-25 VITALS
TEMPERATURE: 97.7 F | HEART RATE: 66 BPM | OXYGEN SATURATION: 97 % | BODY MASS INDEX: 32.18 KG/M2 | DIASTOLIC BLOOD PRESSURE: 80 MMHG | WEIGHT: 188.5 LBS | SYSTOLIC BLOOD PRESSURE: 108 MMHG | HEIGHT: 64 IN | RESPIRATION RATE: 18 BRPM

## 2021-08-25 DIAGNOSIS — E03.8 HYPOTHYROIDISM DUE TO HASHIMOTO'S THYROIDITIS: ICD-10-CM

## 2021-08-25 DIAGNOSIS — Z00.00 ANNUAL PHYSICAL EXAM: Primary | ICD-10-CM

## 2021-08-25 DIAGNOSIS — Z72.0 TOBACCO ABUSE: ICD-10-CM

## 2021-08-25 DIAGNOSIS — R91.1 LUNG NODULE: ICD-10-CM

## 2021-08-25 DIAGNOSIS — E06.3 HYPOTHYROIDISM DUE TO HASHIMOTO'S THYROIDITIS: ICD-10-CM

## 2021-08-25 DIAGNOSIS — E03.9 ACQUIRED HYPOTHYROIDISM: ICD-10-CM

## 2021-08-25 DIAGNOSIS — E78.2 MODERATE MIXED HYPERLIPIDEMIA NOT REQUIRING STATIN THERAPY: ICD-10-CM

## 2021-08-25 DIAGNOSIS — L30.9 DERMATITIS: ICD-10-CM

## 2021-08-25 DIAGNOSIS — F32.1 MODERATE SINGLE CURRENT EPISODE OF MAJOR DEPRESSIVE DISORDER (HCC): ICD-10-CM

## 2021-08-25 PROCEDURE — 3725F SCREEN DEPRESSION PERFORMED: CPT | Performed by: FAMILY MEDICINE

## 2021-08-25 PROCEDURE — 4004F PT TOBACCO SCREEN RCVD TLK: CPT | Performed by: FAMILY MEDICINE

## 2021-08-25 PROCEDURE — 3008F BODY MASS INDEX DOCD: CPT | Performed by: FAMILY MEDICINE

## 2021-08-25 PROCEDURE — 99396 PREV VISIT EST AGE 40-64: CPT | Performed by: FAMILY MEDICINE

## 2021-08-25 RX ORDER — DESONIDE 0.5 MG/G
CREAM TOPICAL 2 TIMES DAILY
Qty: 15 G | Refills: 0 | Status: SHIPPED | OUTPATIENT
Start: 2021-08-25

## 2021-08-25 RX ORDER — CITALOPRAM 10 MG/1
5-10 TABLET ORAL DAILY
Qty: 30 TABLET | Refills: 0 | Status: SHIPPED | OUTPATIENT
Start: 2021-08-25 | End: 2021-10-25 | Stop reason: SDUPTHER

## 2021-08-25 RX ORDER — LEVOTHYROXINE SODIUM 137 UG/1
137 TABLET ORAL DAILY
Qty: 90 TABLET | Refills: 3 | Status: SHIPPED | OUTPATIENT
Start: 2021-08-25

## 2021-08-25 NOTE — ASSESSMENT & PLAN NOTE
Tobacco Cessation Counseling: Tobacco cessation counseling and education was provided  The patient is sincerely urged to quit consumption of tobacco  She is ready to quit tobacco  The numerous health risks of tobacco consumption were discussed  If she decides to quit, there are a number of helpful adjunctive aids, and she can see me to discuss nicotine replacement therapy, chantix, or bupropion anytime in the future  Counseled on behavioral changes, recommended consideration of nicotine replacement therapy, calling QUIT line

## 2021-08-25 NOTE — ASSESSMENT & PLAN NOTE
Lab Results   Component Value Date    [id number] 0 980 08/20/2021    TSH 1 76 07/17/2019     Controlled, continue levothyroxine 137 mcg daily

## 2021-08-25 NOTE — ASSESSMENT & PLAN NOTE
Patient reports stable mood, she is wondering if she can come off of Celexa  Reviewed taper instructions for patient and possible adverse effects of decreasing dose  Discussed that symptoms will typically resolve on their own with time

## 2021-08-25 NOTE — PATIENT INSTRUCTIONS
To taper off of Celexa:  · You can cut in half and take 5 mg daily for 2 weeks, if you tolerate lower dose you can stop  · If you have worsening mood symptoms, you can take 5 mg every other day for 2 weeks

## 2021-10-21 ENCOUNTER — TELEMEDICINE (OUTPATIENT)
Dept: FAMILY MEDICINE CLINIC | Facility: CLINIC | Age: 46
End: 2021-10-21
Payer: COMMERCIAL

## 2021-10-21 DIAGNOSIS — J40 BRONCHITIS: ICD-10-CM

## 2021-10-21 PROCEDURE — 99213 OFFICE O/P EST LOW 20 MIN: CPT | Performed by: PHYSICIAN ASSISTANT

## 2021-10-21 RX ORDER — BENZONATATE 200 MG/1
200 CAPSULE ORAL 3 TIMES DAILY PRN
Qty: 20 CAPSULE | Refills: 0 | Status: SHIPPED | OUTPATIENT
Start: 2021-10-21 | End: 2021-10-25 | Stop reason: SDUPTHER

## 2021-10-22 ENCOUNTER — TELEPHONE (OUTPATIENT)
Dept: FAMILY MEDICINE CLINIC | Facility: CLINIC | Age: 46
End: 2021-10-22

## 2021-10-25 DIAGNOSIS — J40 BRONCHITIS: ICD-10-CM

## 2021-10-25 RX ORDER — BENZONATATE 200 MG/1
200 CAPSULE ORAL 3 TIMES DAILY PRN
Qty: 20 CAPSULE | Refills: 0 | Status: SHIPPED | OUTPATIENT
Start: 2021-10-25

## 2021-11-08 ENCOUNTER — TELEPHONE (OUTPATIENT)
Dept: FAMILY MEDICINE CLINIC | Facility: CLINIC | Age: 46
End: 2021-11-08

## 2021-11-08 ENCOUNTER — OFFICE VISIT (OUTPATIENT)
Dept: URGENT CARE | Facility: CLINIC | Age: 46
End: 2021-11-08
Payer: COMMERCIAL

## 2021-11-08 VITALS — HEART RATE: 84 BPM | TEMPERATURE: 99.4 F | OXYGEN SATURATION: 96 %

## 2021-11-08 DIAGNOSIS — H69.81 EUSTACHIAN TUBE DYSFUNCTION, RIGHT: Primary | ICD-10-CM

## 2021-11-08 DIAGNOSIS — R59.9 ADENOPATHY: ICD-10-CM

## 2021-11-08 PROCEDURE — 99213 OFFICE O/P EST LOW 20 MIN: CPT

## 2021-11-08 RX ORDER — PREDNISONE 10 MG/1
TABLET ORAL
Qty: 20 TABLET | Refills: 0 | Status: SHIPPED | OUTPATIENT
Start: 2021-11-08

## 2021-11-29 ENCOUNTER — HOSPITAL ENCOUNTER (OUTPATIENT)
Dept: RADIOLOGY | Facility: MEDICAL CENTER | Age: 46
Discharge: HOME | End: 2021-11-29
Payer: COMMERCIAL

## 2021-11-29 DIAGNOSIS — R91.1 LUNG NODULE: ICD-10-CM

## 2021-11-29 PROCEDURE — G1004 CDSM NDSC: HCPCS

## 2021-11-29 PROCEDURE — 71250 CT THORAX DX C-: CPT

## 2022-03-14 ENCOUNTER — HOSPITAL ENCOUNTER (OUTPATIENT)
Dept: RADIOLOGY | Age: 47
Discharge: HOME | End: 2022-03-14
Payer: COMMERCIAL

## 2022-03-14 VITALS — WEIGHT: 188.5 LBS | HEIGHT: 64 IN | BODY MASS INDEX: 32.18 KG/M2

## 2022-03-14 DIAGNOSIS — Z12.31 ENCOUNTER FOR SCREENING MAMMOGRAM FOR MALIGNANT NEOPLASM OF BREAST: ICD-10-CM

## 2022-03-14 PROCEDURE — 77063 BREAST TOMOSYNTHESIS BI: CPT

## 2022-03-14 PROCEDURE — 77067 SCR MAMMO BI INCL CAD: CPT

## 2022-04-11 ENCOUNTER — ANNUAL EXAM (OUTPATIENT)
Dept: OBGYN CLINIC | Facility: CLINIC | Age: 47
End: 2022-04-11
Payer: COMMERCIAL

## 2022-04-11 VITALS
WEIGHT: 188 LBS | DIASTOLIC BLOOD PRESSURE: 80 MMHG | BODY MASS INDEX: 32.1 KG/M2 | SYSTOLIC BLOOD PRESSURE: 122 MMHG | HEIGHT: 64 IN

## 2022-04-11 DIAGNOSIS — Z12.31 ENCOUNTER FOR SCREENING MAMMOGRAM FOR MALIGNANT NEOPLASM OF BREAST: ICD-10-CM

## 2022-04-11 DIAGNOSIS — N39.3 SUI (STRESS URINARY INCONTINENCE, FEMALE): ICD-10-CM

## 2022-04-11 DIAGNOSIS — Z01.419 WELL WOMAN EXAM: Primary | ICD-10-CM

## 2022-04-11 PROCEDURE — 4004F PT TOBACCO SCREEN RCVD TLK: CPT | Performed by: PHYSICIAN ASSISTANT

## 2022-04-11 PROCEDURE — 99396 PREV VISIT EST AGE 40-64: CPT | Performed by: PHYSICIAN ASSISTANT

## 2022-04-11 PROCEDURE — 3008F BODY MASS INDEX DOCD: CPT | Performed by: PHYSICIAN ASSISTANT

## 2022-04-11 NOTE — PATIENT INSTRUCTIONS
Urinary Incontinence   AMBULATORY CARE:   Urinary incontinence (UI)  is when you lose control of your bladder  UI develops because your bladder cannot store or empty urine properly  The 3 most common types of UI are stress incontinence, urge incontinence, or both  Common symptoms include the following:   · You feel like your bladder does not empty completely when you urinate  · You urinate often and need to urinate immediately  · You leak urine when you sleep, or you wake up with the urge to urinate  · You leak urine when you cough, sneeze, exercise, or laugh  Call your doctor if:   · You have severe pain  · You are confused or cannot think clearly  · You have a fever  · You see blood in your urine  · You have pain when you urinate  · You have new or worse pain, even after treatment  · Your mouth feels dry or you have vision changes  · Your urine is cloudy or smells bad  · You have questions or concerns about your condition or care  Medicines:   · Medicines  may be given to help strengthen your bladder control  · Take your medicine as directed  Contact your healthcare provider if you think your medicine is not helping or if you have side effects  Tell him or her if you are allergic to any medicine  Keep a list of the medicines, vitamins, and herbs you take  Include the amounts, and when and why you take them  Bring the list or the pill bottles to follow-up visits  Carry your medicine list with you in case of an emergency  Do pelvic muscle exercises often:  Your pelvic muscles help you stop urinating  Squeeze these muscles tight for 5 seconds, then relax for 5 seconds  Gradually work up to squeezing for 10 seconds  Do 3 sets of 15 repetitions a day, or as directed  This will help strengthen your pelvic muscles and improve bladder control  Train your bladder:  Go to the bathroom at set times, such as every 2 hours, even if you do not feel the urge to go   You can also try to hold your urine when you feel the urge to go  For example, hold your urine for 5 minutes when you feel the urge to go  As that becomes easier, hold your urine for 10 minutes  Self-care:   · Keep a UI record  Write down how often you leak urine and how much you leak  Make a note of what you were doing when you leaked urine  · Drink liquids as directed  Ask your healthcare provider how much liquid to drink each day and which liquids are best for you  You may need to limit the amount of liquid you drink to help control your urine leakage  Do not drink any liquid right before you go to bed  Limit or do not have drinks that contain caffeine or alcohol  · Prevent constipation  Eat a variety of high-fiber foods  Good examples are high-fiber cereals, beans, vegetables, and whole-grain breads  Prune juice may help make your bowel movement softer  Walking is the best way to trigger your intestines to have a bowel movement  · Exercise regularly and maintain a healthy weight  Ask your healthcare provider how much you should weigh and about the best exercise plan for you  Weight loss and exercise will decrease pressure on your bladder and help you control your leakage  Ask him or her to help you create a weight loss plan if you are overweight  · Use a catheter as directed  to help empty your bladder  A catheter is a tiny, plastic tube that is put into your bladder to drain your urine  Your healthcare provider may tell you to use a catheter to prevent your bladder from getting too full and leaking urine  · Go to behavior therapy as directed  Behavior therapy may be used to help you learn to control your urge to urinate  Follow up with your doctor as directed:  Write down your questions so you remember to ask them during your visits  © Copyright [employer] 2022 Information is for End User's use only and may not be sold, redistributed or otherwise used for commercial purposes   All illustrations and images included in CareNotes® are the copyrighted property of [employer]  or [employer]   The above information is an  only  It is not intended as medical advice for individual conditions or treatments  Talk to your doctor, nurse or pharmacist before following any medical regimen to see if it is safe and effective for you

## 2022-04-11 NOTE — PROGRESS NOTES
Assessment/Plan   Problem List Items Addressed This Visit     None      Visit Diagnoses     Well woman exam    -  Primary    MORRIS (stress urinary incontinence, female)        Relevant Orders    Ambulatory Referral to Urogynecology    Encounter for screening mammogram for malignant neoplasm of breast        Relevant Orders    Mammo screening bilateral w 3d & cad          Discussion    All questions have been answered to her satisfaction  RTO for APE or sooner if needed  Will plan urogyn follow up to further discuss testing and tx options for MORRIS  Subjective     HPI   Connie Saldana is a 52 y o  female who presents for annual well woman exam    LMP -3/12/22     No vulvar itch/burn; No vaginal itch/burn; No abn discharge or odor;     Pt does note that she has had continued MORRIS over the last year or so  Notes that she wears a pad daily  She has not had any relief w any of the previously discussed therapies such as timed bladder emptying, avoidance of bladder irritants or kegel exercises  She is not interested in pelvci floor PT at this point but would be interested in further discussing surgical tx options  (+) SBEs - no breast masses, asymmetry, nipple discharge or bleeding, changes in skin of breast, or breast tenderness bilaterally    No abd/pelvic pain or HAs; No regular menopausal symptoms: No hot flashes/night sweats, problems with intercourse, vaginal dryness; sleeping well  Pt is sexually active in a mutually monog/ sexual relationship; No issues with intercourse; She declines sti/hiv/hep testing; Feels safe at home      (+) PCP for routine Bw/care; Last Pap - 3/2019 WNl neg HPV  History of abnormal Pap smear: denies   Last mammo - 3/14/22 BIRADs 2   We reviewed most recent mammogram findings  We reviewed the option of addition of ABUS based on radiology recommendations due to family history   We also again briefly reviewed genetic testing but pt is not interested in further pursing either option at this point  She will plan to continue with yearly mammogram screenings  Review of Systems   Constitutional: Negative  Respiratory: Negative  Gastrointestinal: Negative  Endocrine: Negative  Genitourinary: Negative  The following portions of the patient's history were reviewed and updated as appropriate: allergies, current medications, past family history, past medical history, past social history, past surgical history and problem list          OB History        2    Para   2    Term   2            AB        Living   2       SAB        IAB        Ectopic        Multiple        Live Births   2                 Past Medical History:   Diagnosis Date    Depression     Disease of thyroid gland        History reviewed  No pertinent surgical history  Family History   Problem Relation Age of Onset   Junaid Aviles Breast cancer Mother 71    Hypertension Mother     Diabetes Father     Heart disease Father     No Known Problems Sister     No Known Problems Daughter     Breast cancer Maternal Grandmother         unknown age   Junaid Aviles Stomach cancer Maternal Grandfather         unknown age   Junaid Aviles No Known Problems Paternal Grandmother     No Known Problems Paternal Grandfather     No Known Problems Daughter     No Known Problems Sister     No Known Problems Maternal Aunt        Social History     Socioeconomic History    Marital status: /Civil Union     Spouse name: Not on file    Number of children: Not on file    Years of education: Not on file    Highest education level: Not on file   Occupational History    Not on file   Tobacco Use    Smoking status: Current Every Day Smoker     Packs/day: 0 50     Years: 28 00     Pack years: 14 00     Types: Cigarettes    Smokeless tobacco: Never Used   Vaping Use    Vaping Use: Never used   Substance and Sexual Activity    Alcohol use:  Yes     Alcohol/week: 2 0 standard drinks     Types: 1 Glasses of wine, 1 Cans of beer per week     Comment: socially    Drug use: No    Sexual activity: Yes     Partners: Male     Birth control/protection: None   Other Topics Concern    Not on file   Social History Narrative    Not on file     Social Determinants of Health     Financial Resource Strain: Not on file   Food Insecurity: Not on file   Transportation Needs: Not on file   Physical Activity: Not on file   Stress: Not on file   Social Connections: Not on file   Intimate Partner Violence: Not on file   Housing Stability: Not on file         Current Outpatient Medications:     desonide (DESOWEN) 0 05 % cream, Apply topically 2 (two) times a day, Disp: 15 g, Rfl: 0    levothyroxine 137 mcg tablet, Take 1 tablet (137 mcg total) by mouth daily, Disp: 90 tablet, Rfl: 3    Melatonin 10 MG CAPS, Take 10 mg by mouth daily at bedtime, Disp: , Rfl:     benzonatate (TESSALON) 200 MG capsule, Take 1 capsule (200 mg total) by mouth 3 (three) times a day as needed for cough (Patient not taking: Reported on 11/8/2021 ), Disp: 20 capsule, Rfl: 0    predniSONE 10 mg tablet, TAKE 4 TAB PO DAILY X 5 DAYS, WITH FOOD, Disp: 20 tablet, Rfl: 0    No Known Allergies    Objective   Vitals:    04/11/22 0907   BP: 122/80   Weight: 85 3 kg (188 lb)   Height: 5' 3 5" (1 613 m)     Physical Exam  Constitutional:       Appearance: She is well-developed  HENT:      Head: Normocephalic and atraumatic  Cardiovascular:      Rate and Rhythm: Normal rate and regular rhythm  Pulmonary:      Effort: Pulmonary effort is normal       Breath sounds: Normal breath sounds  Chest:   Breasts: Breasts are symmetrical       Right: No inverted nipple, mass, nipple discharge, skin change or tenderness  Left: No inverted nipple, mass, nipple discharge, skin change or tenderness  Abdominal:      General: There is no distension  Palpations: Abdomen is soft  There is no mass  Tenderness: There is no guarding or rebound     Genitourinary:     Exam position: Supine  Labia:         Right: No rash  Left: No rash  Vagina: No signs of injury and foreign body  No vaginal discharge or erythema  Cervix: No cervical motion tenderness  Adnexa:         Right: No mass  Left: No mass  Rectum: Guaiac result negative  Skin:     General: Skin is warm and dry  Neurological:      Mental Status: She is alert and oriented to person, place, and time  Patient Instructions   Urinary Incontinence   AMBULATORY CARE:   Urinary incontinence (UI)  is when you lose control of your bladder  UI develops because your bladder cannot store or empty urine properly  The 3 most common types of UI are stress incontinence, urge incontinence, or both  Common symptoms include the following:   · You feel like your bladder does not empty completely when you urinate  · You urinate often and need to urinate immediately  · You leak urine when you sleep, or you wake up with the urge to urinate  · You leak urine when you cough, sneeze, exercise, or laugh  Call your doctor if:   · You have severe pain  · You are confused or cannot think clearly  · You have a fever  · You see blood in your urine  · You have pain when you urinate  · You have new or worse pain, even after treatment  · Your mouth feels dry or you have vision changes  · Your urine is cloudy or smells bad  · You have questions or concerns about your condition or care  Medicines:   · Medicines  may be given to help strengthen your bladder control  · Take your medicine as directed  Contact your healthcare provider if you think your medicine is not helping or if you have side effects  Tell him or her if you are allergic to any medicine  Keep a list of the medicines, vitamins, and herbs you take  Include the amounts, and when and why you take them  Bring the list or the pill bottles to follow-up visits   Carry your medicine list with you in case of an emergency  Do pelvic muscle exercises often:  Your pelvic muscles help you stop urinating  Squeeze these muscles tight for 5 seconds, then relax for 5 seconds  Gradually work up to squeezing for 10 seconds  Do 3 sets of 15 repetitions a day, or as directed  This will help strengthen your pelvic muscles and improve bladder control  Train your bladder:  Go to the bathroom at set times, such as every 2 hours, even if you do not feel the urge to go  You can also try to hold your urine when you feel the urge to go  For example, hold your urine for 5 minutes when you feel the urge to go  As that becomes easier, hold your urine for 10 minutes  Self-care:   · Keep a UI record  Write down how often you leak urine and how much you leak  Make a note of what you were doing when you leaked urine  · Drink liquids as directed  Ask your healthcare provider how much liquid to drink each day and which liquids are best for you  You may need to limit the amount of liquid you drink to help control your urine leakage  Do not drink any liquid right before you go to bed  Limit or do not have drinks that contain caffeine or alcohol  · Prevent constipation  Eat a variety of high-fiber foods  Good examples are high-fiber cereals, beans, vegetables, and whole-grain breads  Prune juice may help make your bowel movement softer  Walking is the best way to trigger your intestines to have a bowel movement  · Exercise regularly and maintain a healthy weight  Ask your healthcare provider how much you should weigh and about the best exercise plan for you  Weight loss and exercise will decrease pressure on your bladder and help you control your leakage  Ask him or her to help you create a weight loss plan if you are overweight  · Use a catheter as directed  to help empty your bladder  A catheter is a tiny, plastic tube that is put into your bladder to drain your urine   Your healthcare provider may tell you to use a catheter to prevent your bladder from getting too full and leaking urine  · Go to behavior therapy as directed  Behavior therapy may be used to help you learn to control your urge to urinate  Follow up with your doctor as directed:  Write down your questions so you remember to ask them during your visits  © Copyright [employer] 2022 Information is for End User's use only and may not be sold, redistributed or otherwise used for commercial purposes  All illustrations and images included in CareNotes® are the copyrighted property of [employer]  or [facility] [place] [facility]   The above information is an  only  It is not intended as medical advice for individual conditions or treatments  Talk to your doctor, nurse or pharmacist before following any medical regimen to see if it is safe and effective for you

## 2022-08-25 ENCOUNTER — OFFICE VISIT (OUTPATIENT)
Dept: FAMILY MEDICINE CLINIC | Facility: CLINIC | Age: 47
End: 2022-08-25
Payer: COMMERCIAL

## 2022-08-25 VITALS
DIASTOLIC BLOOD PRESSURE: 84 MMHG | TEMPERATURE: 95.5 F | RESPIRATION RATE: 16 BRPM | HEART RATE: 74 BPM | HEIGHT: 64 IN | SYSTOLIC BLOOD PRESSURE: 122 MMHG | OXYGEN SATURATION: 97 % | BODY MASS INDEX: 31.24 KG/M2 | WEIGHT: 183 LBS

## 2022-08-25 DIAGNOSIS — H69.82 DYSFUNCTION OF LEFT EUSTACHIAN TUBE: ICD-10-CM

## 2022-08-25 DIAGNOSIS — Z00.00 HEALTHCARE MAINTENANCE: ICD-10-CM

## 2022-08-25 DIAGNOSIS — E03.9 ACQUIRED HYPOTHYROIDISM: ICD-10-CM

## 2022-08-25 DIAGNOSIS — J06.9 VIRAL URI WITH COUGH: Primary | ICD-10-CM

## 2022-08-25 DIAGNOSIS — J40 BRONCHITIS: ICD-10-CM

## 2022-08-25 PROCEDURE — 99213 OFFICE O/P EST LOW 20 MIN: CPT | Performed by: FAMILY MEDICINE

## 2022-08-25 RX ORDER — BROMPHENIRAMINE MALEATE, PSEUDOEPHEDRINE HYDROCHLORIDE, AND DEXTROMETHORPHAN HYDROBROMIDE 2; 30; 10 MG/5ML; MG/5ML; MG/5ML
5 SYRUP ORAL 4 TIMES DAILY PRN
Qty: 120 ML | Refills: 0 | Status: SHIPPED | OUTPATIENT
Start: 2022-08-25

## 2022-08-25 RX ORDER — ALBUTEROL SULFATE 90 UG/1
2 AEROSOL, METERED RESPIRATORY (INHALATION) EVERY 6 HOURS PRN
Qty: 8.5 G | Refills: 0 | Status: SHIPPED | OUTPATIENT
Start: 2022-08-25

## 2022-08-25 NOTE — PROGRESS NOTES
FAMILY MEDICINE PROGRESS NOTE    Date of Service: 22  Primary Care Provider:   Gris Sevilla MD       Name: Claus Alvarez       : 1975       Age:47 y o  Sex: female      MRN: [id number]      Chief Complaint:Cough and Sinus Problem (Started Friday, patient took Nyquil and Robitusin)       ASSESSMENT and PLAN:  Claus Alvarez is a 52 y o  female with:     Problem List Items Addressed This Visit        Endocrine    Acquired hypothyroidism    Relevant Orders    TSH, 3rd generation      Other Visit Diagnoses     Viral URI with cough    -  Primary    Relevant Medications    brompheniramine-pseudoephedrine-DM 30-2-10 MG/5ML syrup    Bronchitis        Relevant Medications    albuterol (ProAir HFA) 90 mcg/act inhaler    brompheniramine-pseudoephedrine-DM 30-2-10 MG/5ML syrup    Dysfunction of left eustachian tube        Healthcare maintenance        Relevant Orders    Comprehensive metabolic panel    Lipid panel        Patient's symptoms are likely due to viral etiology given chronicity and severity of symptoms  She likely has component of bronchitis as well  Will treat with albuterol inhaler, cough syrup, recommended other supportive care including flonase for post nasal drip, eustachian tube dysfunction  SUBJECTIVE:  Claus Alvarez is a 52 y o  female who presents today with a chief complaint of Cough and Sinus Problem (Started Friday, patient took Nyquil and Robitusin)  HPI     She reports that symptoms started Friday with sore throat  She then developed cough, rhinorrhea, post nasal drip, the post nasal drop is causing the cough  She denies fevers, chills  She did have some shortness of breath and chest tightness on Monday  She has been taking OTC medications, which provide only mild relief  Her most bothersome symptom is nighttime cough  She also now has ear pain  Review of Systems   Constitutional: Negative for chills, fatigue and fever     HENT: Positive for congestion, ear pain, postnasal drip and sore throat  Negative for rhinorrhea  Respiratory: Positive for cough, chest tightness and shortness of breath  I have reviewed the patient's Past Medical History  Current Outpatient Medications:     albuterol (ProAir HFA) 90 mcg/act inhaler, Inhale 2 puffs every 6 (six) hours as needed for wheezing or shortness of breath, Disp: 8 5 g, Rfl: 0    brompheniramine-pseudoephedrine-DM 30-2-10 MG/5ML syrup, Take 5 mL by mouth 4 (four) times a day as needed for allergies, Disp: 120 mL, Rfl: 0    levothyroxine 137 mcg tablet, Take 1 tablet (137 mcg total) by mouth daily, Disp: 90 tablet, Rfl: 3    Melatonin 10 MG CAPS, Take 10 mg by mouth daily at bedtime, Disp: , Rfl:     desonide (DESOWEN) 0 05 % cream, Apply topically 2 (two) times a day (Patient not taking: Reported on 8/25/2022), Disp: 15 g, Rfl: 0    OBJECTIVE:  /84   Pulse 74   Temp (!) 95 5 °F (35 3 °C)   Resp 16   Ht 5' 3 5" (1 613 m)   Wt 83 kg (183 lb)   LMP 08/16/2022 (Approximate)   SpO2 97%   Breastfeeding No   BMI 31 91 kg/m²    BP Readings from Last 3 Encounters:   08/25/22 122/84   04/11/22 122/80   08/25/21 108/80      Wt Readings from Last 3 Encounters:   08/25/22 83 kg (183 lb)   04/11/22 85 3 kg (188 lb)   03/14/22 85 5 kg (188 lb 8 oz)      Physical Exam  Constitutional:       General: She is not in acute distress  Appearance: Normal appearance  She is normal weight  She is not ill-appearing or toxic-appearing  HENT:      Head: Normocephalic and atraumatic  Right Ear: Tympanic membrane, ear canal and external ear normal       Left Ear: Tympanic membrane, ear canal and external ear normal       Nose: Nose normal       Mouth/Throat:      Mouth: Mucous membranes are moist       Pharynx: No oropharyngeal exudate or posterior oropharyngeal erythema  Eyes:      Extraocular Movements: Extraocular movements intact        Conjunctiva/sclera: Conjunctivae normal    Pulmonary:      Effort: Pulmonary effort is normal  No tachypnea, accessory muscle usage, prolonged expiration or respiratory distress  Breath sounds: Decreased air movement present  Decreased breath sounds (diffuse) present  No wheezing  Musculoskeletal:         General: Normal range of motion  Cervical back: Normal range of motion and neck supple  Skin:     Findings: No erythema or rash  Neurological:      General: No focal deficit present  Mental Status: She is alert and oriented to person, place, and time  Psychiatric:         Mood and Affect: Mood normal          Behavior: Behavior normal                 Return if symptoms worsen or fail to improve  Gris Sevilla MD    Note: Portions of the record have been created with voice recognition software  Occasional wrong word or "sound a like" substitutions may have occurred due to the inherent limitations of voice recognition software  Read the chart carefully and recognize, using context, where substitutions have occurred

## 2022-08-26 ENCOUNTER — APPOINTMENT (OUTPATIENT)
Dept: LAB | Age: 47
End: 2022-08-26
Payer: COMMERCIAL

## 2022-08-26 DIAGNOSIS — E03.9 ACQUIRED HYPOTHYROIDISM: ICD-10-CM

## 2022-08-26 DIAGNOSIS — Z00.00 HEALTHCARE MAINTENANCE: ICD-10-CM

## 2022-08-26 LAB
ALBUMIN SERPL BCP-MCNC: 3.4 G/DL (ref 3.5–5)
ALP SERPL-CCNC: 97 U/L (ref 46–116)
ALT SERPL W P-5'-P-CCNC: 71 U/L (ref 12–78)
ANION GAP SERPL CALCULATED.3IONS-SCNC: 5 MMOL/L (ref 4–13)
AST SERPL W P-5'-P-CCNC: 26 U/L (ref 5–45)
BILIRUB SERPL-MCNC: 0.2 MG/DL (ref 0.2–1)
BUN SERPL-MCNC: 10 MG/DL (ref 5–25)
CALCIUM ALBUM COR SERPL-MCNC: 9.4 MG/DL (ref 8.3–10.1)
CALCIUM SERPL-MCNC: 8.9 MG/DL (ref 8.3–10.1)
CHLORIDE SERPL-SCNC: 106 MMOL/L (ref 96–108)
CHOLEST SERPL-MCNC: 166 MG/DL
CO2 SERPL-SCNC: 25 MMOL/L (ref 21–32)
CREAT SERPL-MCNC: 0.68 MG/DL (ref 0.6–1.3)
GFR SERPL CREATININE-BSD FRML MDRD: 104 ML/MIN/1.73SQ M
GLUCOSE SERPL-MCNC: 87 MG/DL (ref 65–140)
HDLC SERPL-MCNC: 42 MG/DL
LDLC SERPL CALC-MCNC: 88 MG/DL (ref 0–100)
NONHDLC SERPL-MCNC: 124 MG/DL
POTASSIUM SERPL-SCNC: 4.3 MMOL/L (ref 3.5–5.3)
PROT SERPL-MCNC: 7.4 G/DL (ref 6.4–8.4)
SODIUM SERPL-SCNC: 136 MMOL/L (ref 135–147)
TRIGL SERPL-MCNC: 179 MG/DL
TSH SERPL DL<=0.05 MIU/L-ACNC: 12.4 UIU/ML (ref 0.45–4.5)

## 2022-08-26 PROCEDURE — 80053 COMPREHEN METABOLIC PANEL: CPT

## 2022-08-26 PROCEDURE — 84443 ASSAY THYROID STIM HORMONE: CPT

## 2022-08-26 PROCEDURE — 80061 LIPID PANEL: CPT

## 2022-08-26 PROCEDURE — 36415 COLL VENOUS BLD VENIPUNCTURE: CPT

## 2022-08-29 NOTE — ASSESSMENT & PLAN NOTE
The 10-year ASCVD risk score (Zi Horvath et al , 2013) is: 3 1%    Values used to calculate the score:      Age: 52 years      Sex: Female      Is Non- : No      Diabetic: No      Tobacco smoker: Yes      Systolic Blood Pressure: 760 mmHg      Is BP treated: No      HDL Cholesterol: 42 mg/dL      Total Cholesterol: 166 mg/dL    Counseled on healthy eating, regular exercise

## 2022-08-29 NOTE — ASSESSMENT & PLAN NOTE
Repeat CT scan in November 2021 with:  Stable, small benign-appearing pulmonary nodules      5 mm left lower lobe groundglass density without soft tissue component  Based on current Fleischner Society 2017 Guidelines on incidental pulmonary nodule, no further workup is necessary      She does not meet criteria for lung cancer screening with current smoking history

## 2022-08-29 NOTE — PROGRESS NOTES
[facility]      Date of Service: 22  Primary Care Provider:   Caesar Tabares MD       Name: Melody Sears       : 1975       Age:47 y o  Sex: female      MRN: [id number]      Chief Complaint:Physical Exam       Assessment and Plan:  52 y o  female exam      1  Health Maintenance  - Colon Cancer Screening: Cologuard ordered today  - Cervical Cancer Screening: follows with GYN, last PAP with normal cytology, negative HPV in 2019, 5 year follow-up  - Breast Cancer Screening: last mammogram in 2022  - Labs: done previously   - Immunizations: Reviewed  Recommend yearly flu vaccine  2  Other diagnoses addressed today:   Problem List Items Addressed This Visit        Endocrine    Acquired hypothyroidism     Lab Results   Component Value Date    [id number] 12 400 (H) 2022    TSH 1 76 2019     Elevated TSH, will increase levothyroxine from 137 mcg to 150 mcg daily  Repeat TSH in 6 weeks         Relevant Medications    levothyroxine 150 mcg tablet       Other    BMI 32 0-32 9,adult    Lung nodule     Repeat CT scan in 2021 with:  Stable, small benign-appearing pulmonary nodules      5 mm left lower lobe groundglass density without soft tissue component  Based on current Fleischner Society 2017 Guidelines on incidental pulmonary nodule, no further workup is necessary  She does not meet criteria for lung cancer screening with current smoking history          Tobacco abuse     Tobacco Cessation Counseling: Tobacco cessation counseling and education was provided  The patient is sincerely urged to quit consumption of tobacco  She is not ready to quit tobacco  The numerous health risks of tobacco consumption were discussed  If she decides to quit, there are a number of helpful adjunctive aids, and she can see me to discuss nicotine replacement therapy, chantix, or bupropion anytime in the future             Moderate mixed hyperlipidemia not requiring statin therapy     The 10-year ASCVD risk score (Jorge Starkey et al , 2013) is: 3 1%    Values used to calculate the score:      Age: 52 years      Sex: Female      Is Non- : No      Diabetic: No      Tobacco smoker: Yes      Systolic Blood Pressure: 486 mmHg      Is BP treated: No      HDL Cholesterol: 42 mg/dL      Total Cholesterol: 166 mg/dL    Counseled on healthy eating, regular exercise         RESOLVED: Moderate single current episode of major depressive disorder ([facility] [address] )      Other Visit Diagnoses     Annual physical exam    -  Primary    Hypothyroidism due to Hashimoto's thyroiditis        Relevant Medications    levothyroxine 150 mcg tablet    Other Relevant Orders    TSH, 3rd generation    Screening for colorectal cancer        Relevant Orders    Cologuard           Immunizations and preventive care screenings were discussed with patient today  Appropriate education was printed on patient's after visit summary  Counseling:  Alcohol/drug use: discussed moderation in alcohol intake, the recommendations for healthy alcohol use, and avoidance of illicit drug use  Dental Health: discussed importance of regular tooth brushing, flossing, and dental visits  Injury prevention: discussed safety/seat belts, safety helmets, smoke detectors, carbon dioxide detectors, and smoking near bedding or upholstery  Exercise: the importance of regular exercise/physical activity was discussed  Recommend exercise 3-5 times per week for at least 30 minutes  BMI Counseling: Body mass index is 32 34 kg/m²  The BMI is above normal  Nutrition recommendations include decreasing portion sizes, encouraging healthy choices of fruits and vegetables, consuming healthier snacks, limiting drinks that contain sugar and reducing intake of saturated and trans fat  Exercise recommendations include moderate physical activity 150 minutes/week and strength training exercises   Rationale for BMI follow-up plan is due to patient being overweight or obese  Follow up next physical in 1 year  Subjective:    David Ron is a 52 y o  female and is here for a comprehensive physical exam      Acute Complaints: None  Diet and Physical Activity  Diet/Nutrition: does not follow a well balanced diet  Exercise: no formal exercise  General Health  Vision: no vision problems and goes for regular eye exams  Dental: regular dental visits  Gyn Health:       Patient's last menstrual period was 08/16/2022 (approximate)    Follows GYN      Histories Updated and Reviewed 8/30/2022:  Patient's Medications   New Prescriptions    No medications on file   Previous Medications    ALBUTEROL (PROAIR HFA) 90 MCG/ACT INHALER    Inhale 2 puffs every 6 (six) hours as needed for wheezing or shortness of breath    BROMPHENIRAMINE-PSEUDOEPHEDRINE-DM 30-2-10 MG/5ML SYRUP    Take 5 mL by mouth 4 (four) times a day as needed for allergies    DESONIDE (DESOWEN) 0 05 % CREAM    Apply topically 2 (two) times a day    MELATONIN 10 MG CAPS    Take 10 mg by mouth daily at bedtime   Modified Medications    Modified Medication Previous Medication    LEVOTHYROXINE 150 MCG TABLET levothyroxine 137 mcg tablet       Take 1 tablet (150 mcg total) by mouth daily    Take 1 tablet (137 mcg total) by mouth daily   Discontinued Medications    No medications on file     No Known Allergies  Past Medical History:   Diagnosis Date    Depression     Disease of thyroid gland      Social History     Socioeconomic History    Marital status: /Civil Union     Spouse name: Not on file    Number of children: Not on file    Years of education: Not on file    Highest education level: Not on file   Occupational History    Not on file   Tobacco Use    Smoking status: Current Every Day Smoker     Packs/day: 0 50     Years: 28 00     Pack years: 14 00     Types: Cigarettes    Smokeless tobacco: Never Used   Vaping Use    Vaping Use: Never used Substance and Sexual Activity    Alcohol use: Yes     Alcohol/week: 2 0 standard drinks     Types: 1 Glasses of wine, 1 Cans of beer per week     Comment: socially    Drug use: No    Sexual activity: Yes     Partners: Male     Birth control/protection: None   Other Topics Concern    Not on file   Social History Narrative    Not on file     Social Determinants of Health     Financial Resource Strain: Not on file   Food Insecurity: Not on file   Transportation Needs: Not on file   Physical Activity: Not on file   Stress: Not on file   Social Connections: Not on file   Intimate Partner Violence: Not on file   Housing Stability: Not on file       There is no immunization history on file for this patient  Objective:  /78   Pulse 78   Temp (!) 95 6 °F (35 3 °C)   Resp 17   Ht 5' 3 5" (1 613 m)   Wt 84 1 kg (185 lb 8 oz)   LMP 08/16/2022 (Approximate)   SpO2 96%   BMI 32 34 kg/m²   BP Readings from Last 3 Encounters:   08/30/22 118/78   08/25/22 122/84   04/11/22 122/80      Wt Readings from Last 3 Encounters:   08/30/22 84 1 kg (185 lb 8 oz)   08/25/22 83 kg (183 lb)   04/11/22 85 3 kg (188 lb)      Physical Exam  Constitutional:       General: She is not in acute distress  Appearance: Normal appearance  She is normal weight  She is not ill-appearing or toxic-appearing  HENT:      Head: Normocephalic and atraumatic  Right Ear: External ear normal       Left Ear: External ear normal       Nose: Nose normal       Mouth/Throat:      Mouth: Mucous membranes are moist    Eyes:      Extraocular Movements: Extraocular movements intact  Conjunctiva/sclera: Conjunctivae normal    Cardiovascular:      Rate and Rhythm: Normal rate and regular rhythm  Pulses: Normal pulses  Heart sounds: Normal heart sounds  No murmur heard  No friction rub  No gallop  Pulmonary:      Effort: Pulmonary effort is normal  No respiratory distress  Breath sounds: Normal breath sounds  Abdominal:      General: There is no distension  Palpations: Abdomen is soft  Tenderness: There is no abdominal tenderness  There is no guarding or rebound  Musculoskeletal:         General: Normal range of motion  Cervical back: Normal range of motion and neck supple  No rigidity  Right lower leg: No edema  Left lower leg: No edema  Skin:     General: Skin is warm and dry  Findings: No erythema or rash  Neurological:      General: No focal deficit present  Mental Status: She is alert and oriented to person, place, and time  Psychiatric:         Mood and Affect: Mood normal          Behavior: Behavior normal          Patient Care Team:  Sana Hare MD as PCP - General (Family Medicine)  Rogena Collet, MD Concetta Neigh, MD    Note: Portions of the record may have been created with voice recognition software  Occasional wrong word or "sound a like" substitutions may have occurred due to the inherent limitations of voice recognition software  Read the chart carefully and recognize, using context, where substitutions have occurred

## 2022-08-29 NOTE — ASSESSMENT & PLAN NOTE
Lab Results   Component Value Date    [id number] 12 400 (H) 08/26/2022    TSH 1 76 07/17/2019     Elevated TSH, will increase levothyroxine from 137 mcg to 150 mcg daily  Repeat TSH in 6 weeks

## 2022-08-30 ENCOUNTER — OFFICE VISIT (OUTPATIENT)
Dept: FAMILY MEDICINE CLINIC | Facility: CLINIC | Age: 47
End: 2022-08-30
Payer: COMMERCIAL

## 2022-08-30 VITALS
HEIGHT: 64 IN | BODY MASS INDEX: 31.67 KG/M2 | TEMPERATURE: 95.6 F | OXYGEN SATURATION: 96 % | SYSTOLIC BLOOD PRESSURE: 118 MMHG | HEART RATE: 78 BPM | DIASTOLIC BLOOD PRESSURE: 78 MMHG | WEIGHT: 185.5 LBS | RESPIRATION RATE: 17 BRPM

## 2022-08-30 DIAGNOSIS — Z12.11 SCREENING FOR COLORECTAL CANCER: ICD-10-CM

## 2022-08-30 DIAGNOSIS — E03.9 ACQUIRED HYPOTHYROIDISM: ICD-10-CM

## 2022-08-30 DIAGNOSIS — Z72.0 TOBACCO ABUSE: ICD-10-CM

## 2022-08-30 DIAGNOSIS — E78.2 MODERATE MIXED HYPERLIPIDEMIA NOT REQUIRING STATIN THERAPY: ICD-10-CM

## 2022-08-30 DIAGNOSIS — Z00.00 ANNUAL PHYSICAL EXAM: Primary | ICD-10-CM

## 2022-08-30 DIAGNOSIS — F32.1 MODERATE SINGLE CURRENT EPISODE OF MAJOR DEPRESSIVE DISORDER (HCC): ICD-10-CM

## 2022-08-30 DIAGNOSIS — E03.8 HYPOTHYROIDISM DUE TO HASHIMOTO'S THYROIDITIS: ICD-10-CM

## 2022-08-30 DIAGNOSIS — R91.1 LUNG NODULE: ICD-10-CM

## 2022-08-30 DIAGNOSIS — E06.3 HYPOTHYROIDISM DUE TO HASHIMOTO'S THYROIDITIS: ICD-10-CM

## 2022-08-30 DIAGNOSIS — Z12.12 SCREENING FOR COLORECTAL CANCER: ICD-10-CM

## 2022-08-30 PROCEDURE — 3725F SCREEN DEPRESSION PERFORMED: CPT | Performed by: FAMILY MEDICINE

## 2022-08-30 PROCEDURE — 99396 PREV VISIT EST AGE 40-64: CPT | Performed by: FAMILY MEDICINE

## 2022-08-30 RX ORDER — LEVOTHYROXINE SODIUM 0.15 MG/1
150 TABLET ORAL DAILY
Qty: 90 TABLET | Refills: 0 | Status: SHIPPED | OUTPATIENT
Start: 2022-08-30

## 2023-01-19 ENCOUNTER — OFFICE VISIT (OUTPATIENT)
Dept: URGENT CARE | Facility: CLINIC | Age: 48
End: 2023-01-19

## 2023-01-19 VITALS
SYSTOLIC BLOOD PRESSURE: 113 MMHG | BODY MASS INDEX: 32.26 KG/M2 | DIASTOLIC BLOOD PRESSURE: 61 MMHG | OXYGEN SATURATION: 99 % | HEART RATE: 69 BPM | TEMPERATURE: 97.4 F | RESPIRATION RATE: 16 BRPM | WEIGHT: 185 LBS

## 2023-01-19 DIAGNOSIS — J06.9 VIRAL UPPER RESPIRATORY TRACT INFECTION: ICD-10-CM

## 2023-01-19 DIAGNOSIS — R06.2 WHEEZING: ICD-10-CM

## 2023-01-19 DIAGNOSIS — R05.2 SUBACUTE COUGH: Primary | ICD-10-CM

## 2023-01-19 RX ORDER — BENZONATATE 200 MG/1
200 CAPSULE ORAL 3 TIMES DAILY PRN
Qty: 20 CAPSULE | Refills: 0 | Status: SHIPPED | OUTPATIENT
Start: 2023-01-19

## 2023-01-19 RX ORDER — ALBUTEROL SULFATE 90 UG/1
2 AEROSOL, METERED RESPIRATORY (INHALATION) EVERY 6 HOURS PRN
Qty: 18 G | Refills: 0 | Status: SHIPPED | OUTPATIENT
Start: 2023-01-19

## 2023-01-19 RX ORDER — AZITHROMYCIN 250 MG/1
TABLET, FILM COATED ORAL
Qty: 6 TABLET | Refills: 0 | Status: SHIPPED | OUTPATIENT
Start: 2023-01-19 | End: 2023-01-24

## 2023-01-19 NOTE — PROGRESS NOTES
[facility]        NAME: Dannielle Lewis is a 52 y o  female  : 1975    MRN: [id number]  DATE: 2023  TIME: 10:30 AM    Assessment and Plan   Subacute cough [R05 2]  1  Subacute cough  azithromycin (ZITHROMAX) 250 mg tablet    benzonatate (TESSALON) 200 MG capsule      2  Viral upper respiratory tract infection        3  Wheezing  albuterol (Ventolin HFA) 90 mcg/act inhaler            Patient Instructions     --Rest, drink plenty of fluids  --Take Rx meds as prescribed  --For nasal/sinus congestion, helpful measures include steam, warm compresses, an OTC saline nasal spray or Neti pot, or an OTC decongestant (such as Sudafed)  The decongestant should be avoided, however, if you are under 10years of age, or have a history of high blood pressure or heart disease  In addition, an OTC nasal steroid (Flonase, Nasocort) or nasal decongestant (Afrin, Juanpablo-synephrine) may be taken  The nasal steroid should be used at bedtime, after the saline nasal spray  The nasal decongestant should not be taken more than 3 days consecutively in order to prevent rebound congestion  --For nasal drainage, postnasal drip, sneezing and itching, an OTC antihistamine (Allegra, Benadryl, etc) can be taken  --For cough, you can take an OTC expectorant such as plain Robitussion or Mucinex (active ingredient guaifenesin)  A spoonful of honey at bedtime may also be helpful, as may a prescription cough medicine  Also recommended is the use of a cool mist humidifier (with or without Vicks) in the bedroom at night  --For sore throat, you can take OTC lozenges, use warm gargles (salt water or apple cider vinegar and honey), herbal teas, or an OTC throat spray (Chloraseptic)  --You can take Tylenol or Motrin/Advil as needed for fever, headache, body aches  Motrin/Advil should be avoided, however, if you have a history of heart disease, bleeding ulcers, or if you take blood thinners     --You should contact your primary care provider and/or go to the ER if your symptoms are not improved or get worse over the next 3-5 days  This includes new onset fever, localized ear pain, sinus pain, as well as worsening cough, chest pain, shortness of breath, or significant weakness/fatigue  --Follow-up with PCP for continued cough beyond the next week as you are a smoker and may need further testing    Chief Complaint     Chief Complaint   Patient presents with   • Cold Like Symptoms     States she has been coughing since November but now coughing more, also having sore throat, slight runny nose, congestion in am         History of Present Illness       Here with complaints of cough, nasal/sinus congestion, rhinorrhea, sore throat, PND x 3 days  Cough has been present x 2 months  More productive over the past couple days, however  Green sputum  Some dyspnea, wheezing, tightness at times  No fever, headache, body aches  Taking OTC Advil Sinus  Denies prior history of asthma, COPD  She is a smoker, however  Negative home COVID test        Review of Systems   Review of Systems   Constitutional: Negative for fever  HENT: Positive for rhinorrhea and sore throat  Negative for ear pain  Respiratory: Positive for cough, chest tightness, shortness of breath and wheezing  Cardiovascular: Negative for chest pain  Gastrointestinal: Negative for abdominal pain, nausea and vomiting  Musculoskeletal: Negative for myalgias  Neurological: Negative for headaches           Current Medications       Current Outpatient Medications:   •  albuterol (Ventolin HFA) 90 mcg/act inhaler, Inhale 2 puffs every 6 (six) hours as needed for wheezing, Disp: 18 g, Rfl: 0  •  azithromycin (ZITHROMAX) 250 mg tablet, Take 2 tablets today then 1 tablet daily x 4 days, Disp: 6 tablet, Rfl: 0  •  benzonatate (TESSALON) 200 MG capsule, Take 1 capsule (200 mg total) by mouth 3 (three) times a day as needed for cough, Disp: 20 capsule, Rfl: 0  • levothyroxine 150 mcg tablet, Take 1 tablet (150 mcg total) by mouth daily, Disp: 90 tablet, Rfl: 0  •  Melatonin 10 MG CAPS, Take 10 mg by mouth daily at bedtime, Disp: , Rfl:   •  brompheniramine-pseudoephedrine-DM 30-2-10 MG/5ML syrup, Take 5 mL by mouth 4 (four) times a day as needed for allergies (Patient not taking: Reported on 1/19/2023), Disp: 120 mL, Rfl: 0  •  desonide (DESOWEN) 0 05 % cream, Apply topically 2 (two) times a day (Patient not taking: Reported on 1/19/2023), Disp: 15 g, Rfl: 0    Current Allergies     Allergies as of 01/19/2023   • (No Known Allergies)            The following portions of the patient's history were reviewed and updated as appropriate: allergies, current medications, past family history, past medical history, past social history, past surgical history and problem list      Past Medical History:   Diagnosis Date   • Depression    • Disease of thyroid gland        No past surgical history on file  Family History   Problem Relation Age of Onset   • Breast cancer Mother 71   • Hypertension Mother    • Diabetes Father    • Heart disease Father    • No Known Problems Sister    • No Known Problems Daughter    • Breast cancer Maternal Grandmother         unknown age   • Stomach cancer Maternal Grandfather         unknown age   • No Known Problems Paternal Grandmother    • No Known Problems Paternal Grandfather    • No Known Problems Daughter    • No Known Problems Sister    • No Known Problems Maternal Aunt          Medications have been verified  Objective   /61   Pulse 69   Temp (!) 97 4 °F (36 3 °C) (Temporal)   Resp 16   Wt 83 9 kg (185 lb)   SpO2 99%   BMI 32 26 kg/m²   No LMP recorded  Physical Exam     Physical Exam  Constitutional:       General: She is not in acute distress  Appearance: Normal appearance  She is well-developed  She is not ill-appearing, toxic-appearing or diaphoretic  HENT:      Head: Normocephalic        Right Ear: Tympanic membrane, ear canal and external ear normal       Left Ear: Tympanic membrane, ear canal and external ear normal       Nose: Congestion and rhinorrhea present  Comments: No sinus tenderness     Mouth/Throat:      Pharynx: No oropharyngeal exudate or posterior oropharyngeal erythema  Eyes:      General:         Right eye: No discharge  Left eye: No discharge  Cardiovascular:      Rate and Rhythm: Normal rate and regular rhythm  Heart sounds: Normal heart sounds  No murmur heard  Pulmonary:      Effort: Pulmonary effort is normal  No respiratory distress  Breath sounds: Normal breath sounds  No stridor  No wheezing, rhonchi or rales  Comments: Breathing easy  No cough noted at this time  Chest:      Chest wall: No tenderness  Abdominal:      Tenderness: There is no abdominal tenderness  Musculoskeletal:      Cervical back: Neck supple  Lymphadenopathy:      Cervical: No cervical adenopathy  Skin:     General: Skin is warm and dry  Neurological:      Mental Status: She is alert and oriented to person, place, and time  Deep Tendon Reflexes: Reflexes are normal and symmetric     Psychiatric:         Mood and Affect: Mood normal

## 2023-01-19 NOTE — PATIENT INSTRUCTIONS
--Rest, drink plenty of fluids  --Take Rx meds as prescribed  --For nasal/sinus congestion, helpful measures include steam, warm compresses, an OTC saline nasal spray or Neti pot, or an OTC decongestant (such as Sudafed)  The decongestant should be avoided, however, if you are under 10years of age, or have a history of high blood pressure or heart disease  In addition, an OTC nasal steroid (Flonase, Nasocort) or nasal decongestant (Afrin, Juanpablo-synephrine) may be taken  The nasal steroid should be used at bedtime, after the saline nasal spray  The nasal decongestant should not be taken more than 3 days consecutively in order to prevent rebound congestion  --For nasal drainage, postnasal drip, sneezing and itching, an OTC antihistamine (Allegra, Benadryl, etc) can be taken  --For cough, you can take an OTC expectorant such as plain Robitussion or Mucinex (active ingredient guaifenesin)  A spoonful of honey at bedtime may also be helpful, as may a prescription cough medicine  Also recommended is the use of a cool mist humidifier (with or without Vicks) in the bedroom at night  --For sore throat, you can take OTC lozenges, use warm gargles (salt water or apple cider vinegar and honey), herbal teas, or an OTC throat spray (Chloraseptic)  --You can take Tylenol or Motrin/Advil as needed for fever, headache, body aches  Motrin/Advil should be avoided, however, if you have a history of heart disease, bleeding ulcers, or if you take blood thinners  --You should contact your primary care provider and/or go to the ER if your symptoms are not improved or get worse over the next 3-5 days  This includes new onset fever, localized ear pain, sinus pain, as well as worsening cough, chest pain, shortness of breath, or significant weakness/fatigue         --Follow-up with PCP for continued cough beyond the next week as you are a smoker and may need further testing

## 2023-03-14 DIAGNOSIS — E06.3 HYPOTHYROIDISM DUE TO HASHIMOTO'S THYROIDITIS: ICD-10-CM

## 2023-03-14 DIAGNOSIS — E03.8 HYPOTHYROIDISM DUE TO HASHIMOTO'S THYROIDITIS: ICD-10-CM

## 2023-03-14 RX ORDER — LEVOTHYROXINE SODIUM 0.15 MG/1
150 TABLET ORAL DAILY
Qty: 90 TABLET | Refills: 0 | Status: SHIPPED | OUTPATIENT
Start: 2023-03-14

## 2023-03-15 ENCOUNTER — ANESTHESIA EVENT (OUTPATIENT)
Dept: PERIOP | Facility: HOSPITAL | Age: 48
End: 2023-03-15

## 2023-03-15 NOTE — PRE-PROCEDURE INSTRUCTIONS
Pre-Surgery Instructions:   Medication Instructions   • levothyroxine 150 mcg tablet Take day of surgery     • Melatonin 10 MG CAPS Take night before surgery

## 2023-03-19 NOTE — ANESTHESIA PREPROCEDURE EVALUATION
Procedure:  RETROPUBIC SLING (Vagina )  CYSTO (Bladder)    Relevant Problems   ANESTHESIA (within normal limits)      CARDIO   (+) Moderate mixed hyperlipidemia not requiring statin therapy      ENDO   (+) Acquired hypothyroidism      Other   (+) BMI 32 0-32 9,adult   (+) Tobacco abuse        Physical Exam    Airway    Mallampati score: III  TM Distance: >3 FB  Neck ROM: full     Dental   No notable dental hx     Cardiovascular  Rhythm: regular, Rate: normal, Cardiovascular exam normal    Pulmonary  Pulmonary exam normal Breath sounds clear to auscultation,     Other Findings        Anesthesia Plan  ASA Score- 2     Anesthesia Type- IV sedation with anesthesia with ASA Monitors  Additional Monitors:   Airway Plan:           Plan Factors-Exercise tolerance (METS): >4 METS  Chart reviewed  Existing labs reviewed  Patient summary reviewed  Patient is a current smoker  Patient instructed to abstain from smoking on day of procedure  Patient did not smoke on day of surgery  Induction- intravenous  Postoperative Plan-     Informed Consent- Anesthetic plan and risks discussed with patient

## 2023-03-20 ENCOUNTER — ANESTHESIA (OUTPATIENT)
Dept: PERIOP | Facility: HOSPITAL | Age: 48
End: 2023-03-20

## 2023-03-20 ENCOUNTER — HOSPITAL ENCOUNTER (OUTPATIENT)
Facility: HOSPITAL | Age: 48
Setting detail: OUTPATIENT SURGERY
Discharge: HOME | End: 2023-03-20
Attending: OBSTETRICS & GYNECOLOGY | Admitting: OBSTETRICS & GYNECOLOGY

## 2023-03-20 VITALS
TEMPERATURE: 97.9 F | RESPIRATION RATE: 16 BRPM | DIASTOLIC BLOOD PRESSURE: 55 MMHG | HEART RATE: 55 BPM | BODY MASS INDEX: 32.66 KG/M2 | HEIGHT: 63 IN | WEIGHT: 184.3 LBS | OXYGEN SATURATION: 100 % | SYSTOLIC BLOOD PRESSURE: 119 MMHG

## 2023-03-20 DIAGNOSIS — N39.3 STRESS INCONTINENCE (FEMALE) (MALE): Primary | ICD-10-CM

## 2023-03-20 PROBLEM — N36.42 INTRINSIC SPHINCTER DEFICIENCY (ISD): Status: ACTIVE | Noted: 2023-03-20

## 2023-03-20 LAB
EXT PREGNANCY TEST URINE: NEGATIVE
EXT. CONTROL: NORMAL

## 2023-03-20 DEVICE — MID-URETHRAL SLING ASSEMBLY, FLAT NEEDLES
Type: IMPLANTABLE DEVICE | Site: VAGINA | Status: FUNCTIONAL
Brand: ATHENA SURGICAL RMUS SYSTEM

## 2023-03-20 RX ORDER — ONDANSETRON 2 MG/ML
INJECTION INTRAMUSCULAR; INTRAVENOUS AS NEEDED
Status: DISCONTINUED | OUTPATIENT
Start: 2023-03-20 | End: 2023-03-20

## 2023-03-20 RX ORDER — GABAPENTIN 400 MG/1
400 CAPSULE ORAL ONCE
Status: COMPLETED | OUTPATIENT
Start: 2023-03-20 | End: 2023-03-20

## 2023-03-20 RX ORDER — IBUPROFEN 600 MG/1
600 TABLET ORAL EVERY 6 HOURS SCHEDULED
Status: DISCONTINUED | OUTPATIENT
Start: 2023-03-20 | End: 2023-03-20 | Stop reason: HOSPADM

## 2023-03-20 RX ORDER — ACETAMINOPHEN 325 MG/1
975 TABLET ORAL EVERY 6 HOURS SCHEDULED
Status: DISCONTINUED | OUTPATIENT
Start: 2023-03-20 | End: 2023-03-20 | Stop reason: HOSPADM

## 2023-03-20 RX ORDER — FENTANYL CITRATE 50 UG/ML
25 INJECTION, SOLUTION INTRAMUSCULAR; INTRAVENOUS
Status: DISCONTINUED | OUTPATIENT
Start: 2023-03-20 | End: 2023-03-20 | Stop reason: HOSPADM

## 2023-03-20 RX ORDER — PROPOFOL 10 MG/ML
INJECTION, EMULSION INTRAVENOUS CONTINUOUS PRN
Status: DISCONTINUED | OUTPATIENT
Start: 2023-03-20 | End: 2023-03-20

## 2023-03-20 RX ORDER — KETOROLAC TROMETHAMINE 30 MG/ML
INJECTION, SOLUTION INTRAMUSCULAR; INTRAVENOUS AS NEEDED
Status: DISCONTINUED | OUTPATIENT
Start: 2023-03-20 | End: 2023-03-20

## 2023-03-20 RX ORDER — LIDOCAINE HYDROCHLORIDE 20 MG/ML
INJECTION, SOLUTION EPIDURAL; INFILTRATION; INTRACAUDAL; PERINEURAL AS NEEDED
Status: DISCONTINUED | OUTPATIENT
Start: 2023-03-20 | End: 2023-03-20

## 2023-03-20 RX ORDER — PROPOFOL 10 MG/ML
INJECTION, EMULSION INTRAVENOUS AS NEEDED
Status: DISCONTINUED | OUTPATIENT
Start: 2023-03-20 | End: 2023-03-20

## 2023-03-20 RX ORDER — IBUPROFEN 600 MG/1
600 TABLET ORAL EVERY 6 HOURS SCHEDULED
Status: DISCONTINUED | OUTPATIENT
Start: 2023-03-20 | End: 2023-03-20

## 2023-03-20 RX ORDER — FENTANYL CITRATE 50 UG/ML
INJECTION, SOLUTION INTRAMUSCULAR; INTRAVENOUS AS NEEDED
Status: DISCONTINUED | OUTPATIENT
Start: 2023-03-20 | End: 2023-03-20

## 2023-03-20 RX ORDER — SODIUM CHLORIDE 9 MG/ML
125 INJECTION, SOLUTION INTRAVENOUS CONTINUOUS
Status: DISCONTINUED | OUTPATIENT
Start: 2023-03-20 | End: 2023-03-20 | Stop reason: HOSPADM

## 2023-03-20 RX ORDER — EPHEDRINE SULFATE 50 MG/ML
INJECTION INTRAVENOUS AS NEEDED
Status: DISCONTINUED | OUTPATIENT
Start: 2023-03-20 | End: 2023-03-20

## 2023-03-20 RX ORDER — IBUPROFEN 600 MG/1
600 TABLET ORAL EVERY 6 HOURS SCHEDULED
Status: CANCELLED | OUTPATIENT
Start: 2023-03-20

## 2023-03-20 RX ORDER — MAGNESIUM HYDROXIDE 1200 MG/15ML
LIQUID ORAL AS NEEDED
Status: DISCONTINUED | OUTPATIENT
Start: 2023-03-20 | End: 2023-03-20 | Stop reason: HOSPADM

## 2023-03-20 RX ORDER — ACETAMINOPHEN 325 MG/1
975 TABLET ORAL ONCE
Status: COMPLETED | OUTPATIENT
Start: 2023-03-20 | End: 2023-03-20

## 2023-03-20 RX ORDER — IBUPROFEN 600 MG/1
600 TABLET ORAL EVERY 6 HOURS PRN
Qty: 30 TABLET | Refills: 0
Start: 2023-03-20

## 2023-03-20 RX ORDER — SENNOSIDES 8.6 MG
1300 CAPSULE ORAL EVERY 8 HOURS PRN
Qty: 30 TABLET | Refills: 0
Start: 2023-03-20

## 2023-03-20 RX ORDER — CEFAZOLIN SODIUM 2 G/50ML
2000 SOLUTION INTRAVENOUS ONCE
Status: COMPLETED | OUTPATIENT
Start: 2023-03-20 | End: 2023-03-20

## 2023-03-20 RX ORDER — ONDANSETRON 2 MG/ML
4 INJECTION INTRAMUSCULAR; INTRAVENOUS EVERY 6 HOURS PRN
Status: DISCONTINUED | OUTPATIENT
Start: 2023-03-20 | End: 2023-03-20 | Stop reason: HOSPADM

## 2023-03-20 RX ORDER — MIDAZOLAM HYDROCHLORIDE 2 MG/2ML
INJECTION, SOLUTION INTRAMUSCULAR; INTRAVENOUS AS NEEDED
Status: DISCONTINUED | OUTPATIENT
Start: 2023-03-20 | End: 2023-03-20

## 2023-03-20 RX ADMIN — MIDAZOLAM 2 MG: 1 INJECTION INTRAMUSCULAR; INTRAVENOUS at 07:10

## 2023-03-20 RX ADMIN — ACETAMINOPHEN 325MG 975 MG: 325 TABLET ORAL at 06:18

## 2023-03-20 RX ADMIN — CEFAZOLIN SODIUM 2000 MG: 2 SOLUTION INTRAVENOUS at 07:21

## 2023-03-20 RX ADMIN — PROPOFOL 30 MG: 10 INJECTION, EMULSION INTRAVENOUS at 07:34

## 2023-03-20 RX ADMIN — GABAPENTIN 400 MG: 400 CAPSULE ORAL at 06:18

## 2023-03-20 RX ADMIN — PROPOFOL 30 MG: 10 INJECTION, EMULSION INTRAVENOUS at 08:06

## 2023-03-20 RX ADMIN — FENTANYL CITRATE 50 MCG: 50 INJECTION INTRAMUSCULAR; INTRAVENOUS at 07:32

## 2023-03-20 RX ADMIN — ONDANSETRON 4 MG: 2 INJECTION INTRAMUSCULAR; INTRAVENOUS at 07:33

## 2023-03-20 RX ADMIN — KETOROLAC TROMETHAMINE 15 MG: 30 INJECTION, SOLUTION INTRAMUSCULAR; INTRAVENOUS at 08:18

## 2023-03-20 RX ADMIN — FENTANYL CITRATE 50 MCG: 50 INJECTION INTRAMUSCULAR; INTRAVENOUS at 07:41

## 2023-03-20 RX ADMIN — IBUPROFEN 600 MG: 600 TABLET, FILM COATED ORAL at 09:36

## 2023-03-20 RX ADMIN — PROPOFOL 30 MG: 10 INJECTION, EMULSION INTRAVENOUS at 07:47

## 2023-03-20 RX ADMIN — PROPOFOL 50 MG: 10 INJECTION, EMULSION INTRAVENOUS at 08:17

## 2023-03-20 RX ADMIN — EPHEDRINE SULFATE 5 MG: 50 INJECTION, SOLUTION INTRAVENOUS at 08:00

## 2023-03-20 RX ADMIN — PROPOFOL 30 MG: 10 INJECTION, EMULSION INTRAVENOUS at 08:10

## 2023-03-20 RX ADMIN — SODIUM CHLORIDE 125 ML/HR: 0.9 INJECTION, SOLUTION INTRAVENOUS at 06:20

## 2023-03-20 RX ADMIN — LIDOCAINE HYDROCHLORIDE 100 MG: 20 INJECTION, SOLUTION EPIDURAL; INFILTRATION; INTRACAUDAL; PERINEURAL at 07:33

## 2023-03-20 RX ADMIN — PROPOFOL 100 MCG/KG/MIN: 10 INJECTION, EMULSION INTRAVENOUS at 07:34

## 2023-03-20 NOTE — ANESTHESIA POSTPROCEDURE EVALUATION
Post-Op Assessment Note    CV Status:  Stable  Pain Score: 1    Pain management: adequate     Mental Status:  Alert and awake   Hydration Status:  Euvolemic   PONV Controlled:  Controlled   Airway Patency:  Patent      Post Op Vitals Reviewed: Yes      Staff: Anesthesiologist         No notable events documented      /67 (03/20/23 0845)    Temp     Pulse 58 (03/20/23 0845)   Resp 16 (03/20/23 0845)    SpO2 99 % (03/20/23 0845)

## 2023-03-20 NOTE — OP NOTE
OPERATIVE REPORT  PATIENT NAME: David Ron    :  1975  MRN: [id number]  Pt Location: [facility] ROOM 04    SURGERY DATE: 3/20/2023    Surgeon(s) and Role: * Lizbeth Adair MD - Primary     * Atul Oliver MD - Fellow first assisting    Preop Diagnosis:  Stress incontinence (female) (male) [N39 3]  Intrinsic sphincter deficiency (ISD) [N36 42]    Post-Op Diagnosis Codes:     * Stress incontinence (female) (male) [N39 3]     * Intrinsic sphincter deficiency (ISD) [N36 42]    Procedure(s):  RETROPUBIC SLING  CYSTOSCOPY    Specimen(s):  * No specimens in log *    Estimated Blood Loss:   30 mL    Drains:  * No LDAs found *    Anesthesia Type:   IV Sedation with Anesthesia    Operative Indications:  Stress incontinence (female) (male) [N39 3]  Intrinsic sphincter deficiency (ISD) [N36 42]    Operative Findings:  Cystoscopy: orthotopic bilateral ureteral orifices  No mesh, suture material, or injury noted to bladder lumen  Mild rectocele and vaginal relaxation noted  Complications:   None    Procedure and Technique:  Appropriate preoperative antibiotics chosen per ACOG guidelines were given  Bilateral SCDs were placed in the lower extremities for DVT prevention prior to the institution of anesthesia  The patient was identified in the holding area by the operating room staff and attending physician  She was taken to the operating room where anesthesia was instituted without complications  She was placed in the dorsal lithotomy position with the legs in [address] [place] [address] with care taken to avoid excessive flexion or extension of her lower extremities  The patient was prepped and draped in the usual sterile fashion  A Smith catheter was inserted  We started the retropubic sling procedure  We identified two areas on the anterior abdominal wall 2 fingerbreadths above the level of pubic bone 2 fingerbreadths lateral to the midline of the pubic bone   We put two skin marks and then we infiltrated with local anesthetic solution at that area and then underneath the pubic bone  We then turned our attention to the anterior vaginal wall  The mid urethral level was identified at the level of the Smith catheter, and local anesthetic solution was injected into the anterior vaginal wall at the midurethral level for hydrodissection and vasoconstriction  Then, local (dilute Marcaine with Epinephrine) was injected at the midline and to the left and right of midline directing the infiltration laterally towards the cephalad aspect of the inferior pubic ramus bilaterally  Care was taken to ensure that the sulcus was flattened and free of infiltration to minimize the chance for erosion  After completion of hydrodissection, 2 Allis clamps were used to grasp the anterior vaginal wall for traction, and a 2 cm incision was made at the midline  Two Allis clamps were then placed on each cut edge of the incision for stabilization  Tenotomy scissors were used to create a small vaginal tunnel with sharp dissection in the anterior vaginal wall directing the dissection lateral to the midline, but going towards the pubic bone  Dissection was carried out to the edge of the pubic bone itself, and we did this bilaterally  Once the dissection was complete, the Smith was used to drain the bladder The rigid catheter guide stylette was placed through the Smith and used for deflection of the bladder, and this was placed without complications  We deviated the bladder to the left side of the patient, and we passed the [place] introducer trocar into the pre-dissected tract on the right side of the patient  Once we pierced the pubocervical fascia and passed the bone, we directed our trajectory of the trocar towards the anterior abdominal wall towards the premarked skin on the right side the patient  Once we were at the level of the skin, we incised the skin at the leno with a scalpel, and the trocar was passed through that incision   The handle  was removed leaving the trocar in place  We removed the Smith and we performed a cystoscopy by instilling 150 mL of fluid inside the bladder  At this time, we saw the tract outside the bladder of the previously placed trocar and we did not see any lacerations or injuries to the bladder  The trocar was rolled without puckering of the bladder mucosa  We reinserted the Smith catheter to drain the bladder  A Rodger clamp was placed over the sling arm and the introducer was cut off with scissors  We replaced the Smith catheter and the bladder was drained  We repeated the same sequence of steps for the placement of the left side  Once the cystoscopy was repeated, we did not see any laceration on the left side upon the bladder being filled with fluid again up to 300 mL  A rodger was placed to hold the sling arm and the introducer was detached from the sling arm with scissors  We woke the patient and had her cough as we adjusted the sling until we saw minimal to no leakage of fluid  We added an additional 50 mL of fluid retrograde due to not seeing any leakage with cough  Then we had her cough again and leakage occurred  Once the sling was tensioned properly we removed the plastic sleeves surrounding the sling arms by gently pulling them up through the skin incisions bilaterally with a Rodger clamp  During this step we had a hemostat placed suburethrally to ensure the sling did not tension more than the position it was in prior to pulling on the sling arms  The sling arms were then trimmed to the level of the skin  Surgiflo was placed into the sling channels given some bleeding after several minutes of pressure was applied  The incision in the vaginal mucosa was closed with 2-0 Vicryl suture in a running locked stitch  Good hemostasis was achieved  The puncture sites over the mons were cleaned and closed with Exofin skin glue       The Smith catheter was reinserted to drain her bladder then removed at the conclusion of the case  The sponge, needle and instrument count were correct x 2  The patient tolerated the procedure well  She was awakened from anesthesia and transferred to the recovery room in stable condition  A qualified resident physician was not available  Dr Carolyn Alonso was present for the entire procedure          Patient Disposition:  PACU         SIGNATURE: Phyllis Rey MD  DATE: March 20, 2023  TIME: 8:27 AM

## 2023-03-20 NOTE — DISCHARGE INSTR - AVS FIRST PAGE
Urethral Sling Procedure   WHAT YOU NEED TO KNOW:   A bladder sling procedure is surgery to treat urinary incontinence in women  The sling acts as a hammock to keep your urethra in place and hold it closed when your bladder is full  You may have vaginal bleeding or discharge for up to a week after your surgery  Use sanitary pads  Do not use tampons  You may have some pelvic discomfort or trouble urinating  DISCHARGE INSTRUCTIONS:   Call 911 for any of the following: You have sudden trouble breathing  Seek care immediately if:   Your bleeding gets worse  You have yellow or foul smelling discharge from your vagina  You cannot urinate, or you are urinating less than what is normal for you  You feel confused  Contact your healthcare provider if:   You have a fever  You do not feel like you are able to empty your bladder completely when you urinate  You feel the need to urinate very suddenly  You have burning or stinging when you urinate  You have blood in your urine  Your skin is itchy, swollen, or you have a rash  You have questions or concerns about your condition or care  Medicines:   Prescription pain medicine  may be given  Ask your how to take this medicine safely  Take your medicine as directed  Contact your healthcare provider if you think your medicine is not helping or if you have side effects  Tell him or her if you are allergic to any medicine  Keep a list of the medicines, vitamins, and herbs you take  Include the amounts, and when and why you take them  Bring the list or the pill bottles to follow-up visits  Carry your medicine list with you in case of an emergency  Self-catheterization:  You may need to put a catheter into your bladder after you urinate to empty any remaining urine  A catheter is a small rubber tube used to drain urine  Healthcare providers will teach you how to put the catheter in safely   This may be needed until you are completely emptying your bladder  Smith catheter: You may have a Smith catheter for a short period of time  The Smith is a tube put into your bladder to drain urine into a bag  Keep the bag below your waist  This will prevent urine from flowing back into your bladder and causing an infection or other problems  Also, keep the tube free of kinks so the urine will drain properly  Do not pull on the catheter  This can cause pain and bleeding, and may cause the catheter to come out  Activity:  Do not lift heavy objects for 6 weeks after your procedure  Do not have intercourse for 4 to 6 weeks  Do not use a tampon for 4 weeks  Ask your healthcare provider when you can return to work or your usual activities  Do pelvic muscle exercises: These are also called Kegel exercises  These exercises help strengthen your pelvic muscles and help prevent urine leakage  Tighten the muscles of your pelvis and hold them tight for 5 seconds, then relax for 5 seconds  Gradually work up to tightening them for 10 seconds and relaxing for 10 seconds  Do this 3 times each day  Keep a record:  Keep a record of when you urinate and if you leak any urine  Write down what you were doing when you leaked urine, such as coughing or sneezing  Bring the log to your follow-up visits  Prevent constipation:  Drink liquids as directed  You may need to drink more water than usual to soften your bowel movements  Eat a variety of healthy foods, especially fruit and foods high in fiber  You may need to use an over-the-counter bowel movement softener  Follow up with your healthcare provider as directed: You may need a test to check how much urine remains in your bladder after you urinate  This will help show how the sling is working  Write down your questions so you remember to ask them during your visits  © 2017 [employer] Information is for End User's use only and may not be sold, redistributed or otherwise used for commercial purposes   All illustrations and images included in CareNotes® are the copyrighted property of A D A M , Inc  or Reji De Jesus  The above information is an  only  It is not intended as medical advice for individual conditions or treatments  Talk to your doctor, nurse or pharmacist before following any medical regimen to see if it is safe and effective for you

## 2023-06-28 DIAGNOSIS — E06.3 HYPOTHYROIDISM DUE TO HASHIMOTO'S THYROIDITIS: ICD-10-CM

## 2023-06-28 DIAGNOSIS — E03.8 HYPOTHYROIDISM DUE TO HASHIMOTO'S THYROIDITIS: ICD-10-CM

## 2023-06-28 RX ORDER — LEVOTHYROXINE SODIUM 0.15 MG/1
TABLET ORAL
Qty: 90 TABLET | Refills: 0 | Status: SHIPPED | OUTPATIENT
Start: 2023-06-28

## 2023-08-31 ENCOUNTER — OFFICE VISIT (OUTPATIENT)
Dept: FAMILY MEDICINE CLINIC | Facility: CLINIC | Age: 48
End: 2023-08-31
Payer: COMMERCIAL

## 2023-08-31 VITALS
BODY MASS INDEX: 32.78 KG/M2 | HEART RATE: 76 BPM | OXYGEN SATURATION: 98 % | SYSTOLIC BLOOD PRESSURE: 120 MMHG | HEIGHT: 63 IN | TEMPERATURE: 97.3 F | DIASTOLIC BLOOD PRESSURE: 60 MMHG | WEIGHT: 185 LBS

## 2023-08-31 DIAGNOSIS — Z12.12 SCREENING FOR COLORECTAL CANCER: ICD-10-CM

## 2023-08-31 DIAGNOSIS — Z00.00 ANNUAL PHYSICAL EXAM: ICD-10-CM

## 2023-08-31 DIAGNOSIS — E78.2 MODERATE MIXED HYPERLIPIDEMIA NOT REQUIRING STATIN THERAPY: ICD-10-CM

## 2023-08-31 DIAGNOSIS — N36.42 INTRINSIC SPHINCTER DEFICIENCY (ISD): ICD-10-CM

## 2023-08-31 DIAGNOSIS — Z12.11 SCREENING FOR COLORECTAL CANCER: ICD-10-CM

## 2023-08-31 DIAGNOSIS — E03.9 ACQUIRED HYPOTHYROIDISM: Primary | ICD-10-CM

## 2023-08-31 DIAGNOSIS — Z12.31 ENCOUNTER FOR SCREENING MAMMOGRAM FOR BREAST CANCER: ICD-10-CM

## 2023-08-31 PROCEDURE — 99396 PREV VISIT EST AGE 40-64: CPT | Performed by: FAMILY MEDICINE

## 2023-08-31 NOTE — ASSESSMENT & PLAN NOTE
S/p sling. Incontinence resolved. Initially had complications with sling tightness and urinary retention.

## 2023-08-31 NOTE — ASSESSMENT & PLAN NOTE
Lab Results   Component Value Date    [id number] 12.400 (H) 08/26/2022     Due for repeat tsh. Current fatigue and hair loss likely secondary to elevated TSH. Patient never repeated the TSH. Repeat TSH and continue levothyroxine 150 mcg. We will need a dose adjustment ultimately. We will message patient with results.

## 2023-08-31 NOTE — PROGRESS NOTES
[id number]    NAME: Antione Manzano  AGE: 50 y.o. SEX: female  : 1975     DATE: 2023     Assessment and Plan:     Problem List Items Addressed This Visit        Endocrine    Acquired hypothyroidism - Primary     Lab Results   Component Value Date    [id number] 12.400 (H) 2022     Due for repeat tsh. Current fatigue and hair loss likely secondary to elevated TSH. Patient never repeated the TSH. Repeat TSH and continue levothyroxine 150 mcg. We will need a dose adjustment ultimately. We will message patient with results. Relevant Orders    TSH, 3rd generation with Free T4 reflex       Other    BMI 32.0-32.9,adult    Relevant Orders    Vitamin D 25 hydroxy    CBC and differential    Comprehensive metabolic panel    Moderate mixed hyperlipidemia not requiring statin therapy    Relevant Orders    Lipid panel    Intrinsic sphincter deficiency (ISD)     S/p sling. Incontinence resolved. Initially had complications with sling tightness and urinary retention. Other Visit Diagnoses     Annual physical exam        Encounter for screening mammogram for breast cancer        Relevant Orders    Mammo screening bilateral w 3d & cad    Screening for colorectal cancer        Relevant Orders    Ambulatory referral to Gastroenterology          Immunizations and preventive care screenings were discussed with patient today. Appropriate education was printed on patient's after visit summary. Counseling:  Alcohol/drug use: discussed moderation in alcohol intake, the recommendations for healthy alcohol use, and avoidance of illicit drug use. Dental Health: discussed importance of regular tooth brushing, flossing, and dental visits. Injury prevention: discussed safety/seat belts, safety helmets, smoke detectors, carbon dioxide detectors, and smoking near bedding or upholstery.   Sexual health: discussed sexually transmitted diseases, partner selection, use of condoms, avoidance of unintended pregnancy, and contraceptive alternatives. Exercise: the importance of regular exercise/physical activity was discussed. Recommend exercise 3-5 times per week for at least 30 minutes. Return in 1 year (on 2024). Chief Complaint:     Chief Complaint   Patient presents with   • Physical Exam      History of Present Illness:     Adult Annual Physical   Patient here for a comprehensive physical exam. The patient reports problems - fatigue hair loss. Diet and Physical Activity  Diet/Nutrition: poor diet and limited fruits/vegetables. Exercise: no formal exercise. Depression Screening  PHQ-2/9 Depression Screening    Little interest or pleasure in doing things: 0 - not at all  Feeling down, depressed, or hopeless: 0 - not at all  Trouble falling or staying asleep, or sleeping too much: 0 - not at all  Feeling tired or having little energy: 0 - not at all  Poor appetite or overeatin - not at all  Feeling bad about yourself - or that you are a failure or have let yourself or your family down: 0 - not at all  Trouble concentrating on things, such as reading the newspaper or watching television: 0 - not at all  Moving or speaking so slowly that other people could have noticed. Or the opposite - being so fidgety or restless that you have been moving around a lot more than usual: 0 - not at all  Thoughts that you would be better off dead, or of hurting yourself in some way: 0 - not at all  PHQ-2 Score: 0  PHQ-2 Interpretation: Negative depression screen  PHQ-9 Score: 0   PHQ-9 Interpretation: No or Minimal depression        General Health  Sleep: sleeps well, gets 7-8 hours of sleep on average and melatonin. Hearing: normal - bilateral.  Vision: readers. Dental: regular dental visits.        /GYN Health  Last menstrual period: Regular, monthly   Follows with [facility]      Review of Systems:     Review of Systems   Constitutional: Positive for fatigue and unexpected weight change. Negative for fever. HENT: Negative for sore throat. Eyes: Negative for visual disturbance. Respiratory: Negative for cough, chest tightness and shortness of breath. Cardiovascular: Negative for chest pain, palpitations and leg swelling. Gastrointestinal: Negative for abdominal pain, constipation, diarrhea and nausea. Endocrine: Negative for cold intolerance and heat intolerance. Genitourinary: Negative for flank pain. Musculoskeletal: Negative for back pain and neck pain. Skin: Negative for rash. Neurological: Negative for headaches. Psychiatric/Behavioral: Positive for sleep disturbance. Negative for behavioral problems and confusion. Past Medical History:     Past Medical History:   Diagnosis Date   • Depression    • Disease of thyroid gland       Past Surgical History:     Past Surgical History:   Procedure Laterality Date   • ID CYSTOURETHROSCOPY N/A 3/20/2023    Procedure: Louis Ke;  Surgeon: Silver Person MD;  Location: [facility];  Service: UroGynecology          • ID SLING OPERATION STRESS INCONTINENCE N/A 3/20/2023    Procedure: Leidy Davis;  Surgeon: Silver Perosn MD;  Location: [facility] OR;  Service: UroGynecology             Social History:     Social History     Socioeconomic History   • Marital status: /Civil Union     Spouse name: None   • Number of children: None   • Years of education: None   • Highest education level: None   Occupational History   • None   Tobacco Use   • Smoking status: Every Day     Packs/day: 0.50     Years: 28.00     Total pack years: 14.00     Types: Cigarettes   • Smokeless tobacco: Never   Vaping Use   • Vaping Use: Never used   Substance and Sexual Activity   • Alcohol use:  Yes     Alcohol/week: 2.0 standard drinks of alcohol     Types: 1 Glasses of wine, 1 Cans of beer per week     Comment: socially   • Drug use: No   • Sexual activity: Yes Partners: Male     Birth control/protection: None   Other Topics Concern   • None   Social History Narrative   • None     Social Determinants of Health     Financial Resource Strain: Not on file   Food Insecurity: Not on file   Transportation Needs: Not on file   Physical Activity: Not on file   Stress: Not on file   Social Connections: Not on file   Intimate Partner Violence: Not on file   Housing Stability: Not on file      Family History:     Family History   Problem Relation Age of Onset   • Breast cancer Mother 71   • Hypertension Mother    • Diabetes Father    • Heart disease Father    • No Known Problems Sister    • No Known Problems Daughter    • Breast cancer Maternal Grandmother         unknown age   • Stomach cancer Maternal Grandfather         unknown age   • No Known Problems Paternal Grandmother    • No Known Problems Paternal Grandfather    • No Known Problems Daughter    • No Known Problems Sister    • No Known Problems Maternal Aunt       Current Medications:     Current Outpatient Medications   Medication Sig Dispense Refill   • levothyroxine 150 mcg tablet take 1 tablet by mouth daily 90 tablet 0   • Melatonin 10 MG CAPS Take 10 mg by mouth daily at bedtime       No current facility-administered medications for this visit. Allergies:     No Known Allergies   Physical Exam:     /60 (BP Location: Left arm, Patient Position: Sitting, Cuff Size: Large)   Pulse 76   Temp (!) 97.3 °F (36.3 °C)   Ht 5' 3" (1.6 m)   Wt 83.9 kg (185 lb)   SpO2 98%   BMI 32.77 kg/m²     Physical Exam  Vitals and nursing note reviewed. Constitutional:       Appearance: Normal appearance. She is well-developed. HENT:      Head: Normocephalic and atraumatic. Eyes:      Extraocular Movements: Extraocular movements intact. Pupils: Pupils are equal, round, and reactive to light. Cardiovascular:      Rate and Rhythm: Normal rate and regular rhythm.    Pulmonary:      Effort: Pulmonary effort is normal. Breath sounds: Normal breath sounds. Abdominal:      General: Bowel sounds are normal.      Palpations: Abdomen is soft. Musculoskeletal:      Cervical back: Normal range of motion. Skin:     General: Skin is warm and dry. Neurological:      General: No focal deficit present. Mental Status: She is alert and oriented to person, place, and time.    Psychiatric:         Mood and Affect: Mood normal.         Speech: Speech normal.         Behavior: Behavior normal.          Antony [place] MD  [address]

## 2023-09-06 ENCOUNTER — TELEPHONE (OUTPATIENT)
Age: 48
End: 2023-09-06

## 2023-09-06 DIAGNOSIS — Z12.11 SPECIAL SCREENING FOR MALIGNANT NEOPLASMS, COLON: Primary | ICD-10-CM

## 2023-09-06 NOTE — TELEPHONE ENCOUNTER
NP; no prior fc; screening colonoscopy; BMI 32    Scheduled 9/18/23 @ [facility]  Paperwork mailed to patient.

## 2023-09-06 NOTE — LETTER
Elizabeth Walter  [address] [place] [phone]        FLEXIBLE COLONOSCOPY INSTRUCTIONS  PLEASE NOTE. .. AS OF JUNE 1, 2014, OUR OFFICE REQUIRES 72 HOURS NOTICE OF CANCELLATION/RESCHEDULE OF A PROCEDURE TO AVOID INCURRING A MISSED APPOINTMENT FEE. Your Colonoscopy Procedure has been scheduled at:  [address] [place] [phone]    The Date of your Procedure is: September 18, 2023    Dr. Lenwood Mcardle  will be performing the procedure. Report to the [facility] 45 minutes prior to the procedure. The total time at the facility will be approximately 1 1/2 hours. Please bring to your procedure at [facility]:   1. Insurance Cards and referrals if required by [employer] company   2. Valid Photo ID   3. Power of  Form if required    Use the bowel preparation as directed. Check with your family doctor if you are taking a blood thinner (Coumadin, Plavix, Xarelto, Pradaxa, Gingko biloba, Ginseng, Feverfew, Elvaston's Wort). We suggest stopping these for 3 days. Special instructions may be needed if you are taking aspirin or any aspirin-containing medication. Check with your family physician. If you are on DIABETIC MEDICATION (tablets or insulin) your doctor may make changes in your preparation. Take all medications usual unless otherwise instructed. Feel free to call the physician's office to answer any questions or address any concerns you have regarding your procedure or preparation. A nurse will be happy to assist you. Because anesthesia is given to you during the procedure, the center requires that you be discharged under supervision of an adult to take you home after the procedure is performed. They will also need to sign as a witness on your discharge instructions. Public Transportation such as VAST, BUS, TAXI is Not Acceptable.     It is important you notify our office of any insurance changes prior to your procedure and, if necessary, supply us with referrals from your primary care physician. COLONOSCOPY PREPARATION INSTRUCTIONS    Purchase (prescription not required):  · 238 gram bottle of Miralax® (Glycolax®)  · 4 Dulcolax® (Bisacodyl) Laxative Tablets  · 64 oz. bottle of Gatorade® or your preference of a non-carbonated clear liquid - NOT RED OR PURPLE     One Day Prior to Colonoscopy Procedure  · Nothing to eat the day before your procedure, only clear liquids. · It is important that you drink plenty of clear liquids throughout the day to prevent dehydration. Clear Liquids include:  o Water/Iced Tea/Lemonade/Gatorade®/Black Coffee or tea (no milk or creamers  o Soft drinks: orange, ginger ale, cola, Pepsi®, Sprite®, 7Up®  o Angel-Aid® (lemonade or orange flavors only)  o Strained fruit juices without pulp such as apple, white grape, white cranberry  o Jell-O®, lemon, lime or orange (no fruit or toppings)  o Popsicles, Uche Ice (No Ice Cream, sherbets, or fruit bars)  o Chicken or beef bouillon/broth  DO NOT EAT OR DRINK ANYTHING RED OR PURPLE  DO NOT DRINK ANY ALCOHOLIC BEVERAGES  DIABETIC PATIENTS: Consult your physician    At 4:00 pm, take (2) Dulcolax® (Bisacodyl) Laxative Tablets. Swallow the tablets whole with an 8 oz. glass of water. At 8:00 pm, take the additional (2) Dulcolax® (Bisacodyl) Laxative Tablets with 8 oz. of water. The package may direct you not to exceed (2) tablets at any time but for the purpose of this examination, you should take (4) total.    Mix the 238 gm of Miralax® in 64 oz. of Gatorade® and shake the solution until the Miralax® is dissolved. You will drink half (32 oz) of this solution this evening, beginning between 4 and 6 o'clock. Drink 8 oz glassfuls at your own pace. It may take several hours to drink the solution. Remember to stay close to toilet facilities.     DAY OF COLONOSCOPY PROCEDURE    Five (5) hours before your procedure, drink the other half (32 oz) of the Miralax®/Gatorade® mixture within a two (2) hour period. This may require you to get up very early if you are scheduled for an early procedure. NOTHING IS TO BE TAKEN BY MOUTH 3 HOURS PRIOR TO PROCEDURE. If you use an inhaler, please bring it with you to your procedure.

## 2023-09-07 LAB
25(OH)D3 SERPL-MCNC: 34 NG/ML (ref 30–100)
ALBUMIN SERPL-MCNC: 3.9 G/DL (ref 3.6–5.1)
ALBUMIN/GLOB SERPL: 1.4 (CALC) (ref 1–2.5)
ALP SERPL-CCNC: 75 U/L (ref 31–125)
ALT SERPL-CCNC: 19 U/L (ref 6–29)
AST SERPL-CCNC: 11 U/L (ref 10–35)
BASOPHILS # BLD AUTO: 58 CELLS/UL (ref 0–200)
BASOPHILS NFR BLD AUTO: 0.6 %
BILIRUB SERPL-MCNC: 0.4 MG/DL (ref 0.2–1.2)
BUN SERPL-MCNC: 10 MG/DL (ref 7–25)
BUN/CREAT SERPL: NORMAL (CALC) (ref 6–22)
CALCIUM SERPL-MCNC: 8.8 MG/DL (ref 8.6–10.2)
CHLORIDE SERPL-SCNC: 104 MMOL/L (ref 98–110)
CHOLEST SERPL-MCNC: 171 MG/DL
CHOLEST/HDLC SERPL: 3.7 (CALC)
CO2 SERPL-SCNC: 28 MMOL/L (ref 20–32)
CREAT SERPL-MCNC: 0.68 MG/DL (ref 0.5–0.99)
EOSINOPHIL # BLD AUTO: 146 CELLS/UL (ref 15–500)
EOSINOPHIL NFR BLD AUTO: 1.5 %
ERYTHROCYTE [DISTWIDTH] IN BLOOD BY AUTOMATED COUNT: 13.4 % (ref 11–15)
GFR/BSA.PRED SERPLBLD CYS-BASED-ARV: 107 ML/MIN/1.73M2
GLOBULIN SER CALC-MCNC: 2.7 G/DL (CALC) (ref 1.9–3.7)
GLUCOSE SERPL-MCNC: 93 MG/DL (ref 65–99)
HCT VFR BLD AUTO: 43.4 % (ref 35–45)
HDLC SERPL-MCNC: 46 MG/DL
HGB BLD-MCNC: 14.6 G/DL (ref 11.7–15.5)
LDLC SERPL CALC-MCNC: 97 MG/DL (CALC)
LYMPHOCYTES # BLD AUTO: 1882 CELLS/UL (ref 850–3900)
LYMPHOCYTES NFR BLD AUTO: 19.4 %
MCH RBC QN AUTO: 30.7 PG (ref 27–33)
MCHC RBC AUTO-ENTMCNC: 33.6 G/DL (ref 32–36)
MCV RBC AUTO: 91.4 FL (ref 80–100)
MONOCYTES # BLD AUTO: 747 CELLS/UL (ref 200–950)
MONOCYTES NFR BLD AUTO: 7.7 %
NEUTROPHILS # BLD AUTO: 6868 CELLS/UL (ref 1500–7800)
NEUTROPHILS NFR BLD AUTO: 70.8 %
NONHDLC SERPL-MCNC: 125 MG/DL (CALC)
PLATELET # BLD AUTO: 281 THOUSAND/UL (ref 140–400)
PMV BLD REES-ECKER: 10.9 FL (ref 7.5–12.5)
POTASSIUM SERPL-SCNC: 4.6 MMOL/L (ref 3.5–5.3)
PROT SERPL-MCNC: 6.6 G/DL (ref 6.1–8.1)
RBC # BLD AUTO: 4.75 MILLION/UL (ref 3.8–5.1)
SODIUM SERPL-SCNC: 136 MMOL/L (ref 135–146)
TRIGL SERPL-MCNC: 184 MG/DL
TSH SERPL-ACNC: 1.68 MIU/L
WBC # BLD AUTO: 9.7 THOUSAND/UL (ref 3.8–10.8)

## 2023-09-11 ENCOUNTER — TELEPHONE (OUTPATIENT)
Age: 48
End: 2023-09-11

## 2023-09-11 NOTE — TELEPHONE ENCOUNTER
Patients GI provider:  Dr. Ronda Kwon    Number to return call: [phone]    Reason for call: Pt calling to reschedule her ext procedure with Dr. Ronda Kwon.      Scheduled procedure/appointment date if applicable: procedure 4/07/81

## 2023-10-16 DIAGNOSIS — E06.3 HYPOTHYROIDISM DUE TO HASHIMOTO'S THYROIDITIS: ICD-10-CM

## 2023-10-16 DIAGNOSIS — E03.8 HYPOTHYROIDISM DUE TO HASHIMOTO'S THYROIDITIS: ICD-10-CM

## 2023-10-16 RX ORDER — LEVOTHYROXINE SODIUM 0.15 MG/1
150 TABLET ORAL DAILY
Qty: 90 TABLET | Refills: 0 | Status: SHIPPED | OUTPATIENT
Start: 2023-10-16

## 2023-10-19 ENCOUNTER — HOSPITAL ENCOUNTER (OUTPATIENT)
Dept: RADIOLOGY | Facility: MEDICAL CENTER | Age: 48
Discharge: HOME | End: 2023-10-19
Payer: COMMERCIAL

## 2023-10-19 VITALS — BODY MASS INDEX: 32.78 KG/M2 | WEIGHT: 185 LBS | HEIGHT: 63 IN

## 2023-10-19 DIAGNOSIS — Z12.31 ENCOUNTER FOR SCREENING MAMMOGRAM FOR BREAST CANCER: ICD-10-CM

## 2023-10-19 PROCEDURE — 77063 BREAST TOMOSYNTHESIS BI: CPT

## 2023-10-19 PROCEDURE — 77067 SCR MAMMO BI INCL CAD: CPT

## 2023-12-27 DIAGNOSIS — R05.1 ACUTE COUGH: Primary | ICD-10-CM

## 2024-02-02 DIAGNOSIS — E03.8 HYPOTHYROIDISM DUE TO HASHIMOTO'S THYROIDITIS: ICD-10-CM

## 2024-02-02 DIAGNOSIS — E06.3 HYPOTHYROIDISM DUE TO HASHIMOTO'S THYROIDITIS: ICD-10-CM

## 2024-02-02 RX ORDER — LEVOTHYROXINE SODIUM 0.15 MG/1
150 TABLET ORAL DAILY
Qty: 90 TABLET | Refills: 1 | Status: SHIPPED | OUTPATIENT
Start: 2024-02-02

## 2024-02-02 NOTE — TELEPHONE ENCOUNTER
Reason for call:   [x] Refill   [] Prior Auth  [] Other:     Office:   [x] PCP/Provider -   [] Specialty/Provider -     Medication: levothyroxine 150 mcg, take 1 tablet by mouth daily      Pharmacy: [facility] [place] [place]    Does the patient have enough for 3 days?   [x] Yes   [] No - Send as HP to POD

## 2024-03-27 ENCOUNTER — OFFICE VISIT (OUTPATIENT)
Dept: FAMILY MEDICINE CLINIC | Facility: CLINIC | Age: 49
End: 2024-03-27
Payer: COMMERCIAL

## 2024-03-27 VITALS
SYSTOLIC BLOOD PRESSURE: 118 MMHG | TEMPERATURE: 98.4 F | BODY MASS INDEX: 32.96 KG/M2 | RESPIRATION RATE: 16 BRPM | OXYGEN SATURATION: 100 % | DIASTOLIC BLOOD PRESSURE: 74 MMHG | HEIGHT: 63 IN | HEART RATE: 84 BPM | WEIGHT: 186 LBS

## 2024-03-27 DIAGNOSIS — L65.8 FEMALE PATTERN HAIR LOSS: Primary | ICD-10-CM

## 2024-03-27 DIAGNOSIS — F41.9 ANXIETY: ICD-10-CM

## 2024-03-27 DIAGNOSIS — E03.9 ACQUIRED HYPOTHYROIDISM: ICD-10-CM

## 2024-03-27 PROCEDURE — 99214 OFFICE O/P EST MOD 30 MIN: CPT | Performed by: FAMILY MEDICINE

## 2024-03-27 RX ORDER — FLUOXETINE 10 MG/1
10 CAPSULE ORAL DAILY
Qty: 30 CAPSULE | Refills: 0 | Status: SHIPPED | OUTPATIENT
Start: 2024-03-27

## 2024-03-27 NOTE — PROGRESS NOTES
Name: Meaghan Fitzgerald      : 1975      MRN: [id number]  Encounter Provider: Chet Sanchez MD  Encounter Date: 3/27/2024   Encounter department: [facility]    Assessment & Plan     1. Female pattern hair loss  -     CBC and differential; Future  -     Iron Panel (Includes Ferritin, Iron Sat%, Iron, and TIBC); Future  -     Comprehensive metabolic panel; Future    2. Acquired hypothyroidism  -     TSH + Free T4; Future    3. BMI 32.0-32.9,adult  -     Vitamin D 25 hydroxy; Future    4. Anxiety  -     FLUoxetine (PROzac) 10 mg capsule; Take 1 capsule (10 mg total) by mouth daily    Follows with [facility]. Recommend obtaining labs and following up with Derm.   Could consider Nutrafol for hair loss   Start women's multivitamin   Continue levothyroxine 150 mcg daily  Start prozac for anxiety   Lab work listed above  Follow-up in 4 weeks for recheck       Subjective        Patient presents today to discuss ongoing hair loss.  She is noticing clumps of hair in the shower for about the past month.  This previously happened in the fall but stopped and grew back.  She is unsure which vitamins she should be taking.  She would like to repeat her blood work.  She also endorses worsening stress and anxiety over her family.  Struggling to sleep has been taking melatonin.  Feels that she is always anxious.  Previously on Celexa        Review of Systems   Constitutional:  Positive for fatigue. Negative for activity change and fever.   HENT:          Hair loss   Clumps of hair in the shower    Eyes:  Negative for visual disturbance.   Respiratory:  Negative for shortness of breath.    Cardiovascular:  Negative for chest pain.   Gastrointestinal:  Negative for abdominal pain, constipation, diarrhea and nausea.   Endocrine: Negative for cold intolerance and heat intolerance.   Musculoskeletal:  Negative for back pain.   Skin:  Negative for rash.   Neurological:  Negative for headaches.  "  Psychiatric/Behavioral:  Negative for confusion.        Current Outpatient Medications on File Prior to Visit   Medication Sig   • levothyroxine 150 mcg tablet Take 1 tablet (150 mcg total) by mouth daily   • Melatonin 10 MG CAPS Take 10 mg by mouth daily at bedtime       Objective     /74 (BP Location: Left arm, Patient Position: Sitting, Cuff Size: Large)   Pulse 84   Temp 98.4 °F (36.9 °C)   Resp 16   Ht 5' 3\" (1.6 m)   Wt 84.4 kg (186 lb)   SpO2 100%   BMI 32.95 kg/m²     Physical Exam  Vitals and nursing note reviewed.   Constitutional:       Appearance: She is well-developed.   HENT:      Head: Normocephalic and atraumatic.      Comments: Hair thinning   Cardiovascular:      Rate and Rhythm: Normal rate and regular rhythm.   Pulmonary:      Effort: Pulmonary effort is normal.      Breath sounds: Normal breath sounds.   Neurological:      General: No focal deficit present.      Mental Status: She is alert.   Psychiatric:         Mood and Affect: Mood normal.         Behavior: Behavior normal.       Chet Sanchez MD    "

## 2024-04-22 ENCOUNTER — APPOINTMENT (OUTPATIENT)
Dept: LAB | Facility: CLINIC | Age: 49
End: 2024-04-22
Payer: COMMERCIAL

## 2024-04-22 DIAGNOSIS — E78.2 MODERATE MIXED HYPERLIPIDEMIA NOT REQUIRING STATIN THERAPY: ICD-10-CM

## 2024-04-22 DIAGNOSIS — L65.8 FEMALE PATTERN HAIR LOSS: ICD-10-CM

## 2024-04-22 DIAGNOSIS — E03.9 ACQUIRED HYPOTHYROIDISM: ICD-10-CM

## 2024-04-22 LAB
25(OH)D3 SERPL-MCNC: 19.6 NG/ML (ref 30–100)
ALBUMIN SERPL BCP-MCNC: 3.9 G/DL (ref 3.5–5)
ALP SERPL-CCNC: 73 U/L (ref 34–104)
ALT SERPL W P-5'-P-CCNC: 28 U/L (ref 7–52)
ANION GAP SERPL CALCULATED.3IONS-SCNC: 4 MMOL/L (ref 4–13)
AST SERPL W P-5'-P-CCNC: 17 U/L (ref 13–39)
BASOPHILS # BLD AUTO: 0.07 THOUSANDS/ÂΜL (ref 0–0.1)
BASOPHILS NFR BLD AUTO: 1 % (ref 0–1)
BILIRUB SERPL-MCNC: 0.48 MG/DL (ref 0.2–1)
BUN SERPL-MCNC: 13 MG/DL (ref 5–25)
CALCIUM SERPL-MCNC: 8.6 MG/DL (ref 8.4–10.2)
CHLORIDE SERPL-SCNC: 104 MMOL/L (ref 96–108)
CHOLEST SERPL-MCNC: 157 MG/DL
CO2 SERPL-SCNC: 30 MMOL/L (ref 21–32)
CREAT SERPL-MCNC: 0.71 MG/DL (ref 0.6–1.3)
EOSINOPHIL # BLD AUTO: 0.18 THOUSAND/ÂΜL (ref 0–0.61)
EOSINOPHIL NFR BLD AUTO: 2 % (ref 0–6)
ERYTHROCYTE [DISTWIDTH] IN BLOOD BY AUTOMATED COUNT: 14.6 % (ref 11.6–15.1)
FERRITIN SERPL-MCNC: 76 NG/ML (ref 11–307)
GFR SERPL CREATININE-BSD FRML MDRD: 100 ML/MIN/1.73SQ M
GLUCOSE P FAST SERPL-MCNC: 93 MG/DL (ref 65–99)
HCT VFR BLD AUTO: 44.7 % (ref 34.8–46.1)
HDLC SERPL-MCNC: 58 MG/DL
HGB BLD-MCNC: 14.8 G/DL (ref 11.5–15.4)
IMM GRANULOCYTES # BLD AUTO: 0.03 THOUSAND/UL (ref 0–0.2)
IMM GRANULOCYTES NFR BLD AUTO: 0 % (ref 0–2)
IRON SATN MFR SERPL: 34 % (ref 15–50)
IRON SERPL-MCNC: 119 UG/DL (ref 50–212)
LDLC SERPL CALC-MCNC: 67 MG/DL (ref 0–100)
LYMPHOCYTES # BLD AUTO: 1.9 THOUSANDS/ÂΜL (ref 0.6–4.47)
LYMPHOCYTES NFR BLD AUTO: 24 % (ref 14–44)
MCH RBC QN AUTO: 31.2 PG (ref 26.8–34.3)
MCHC RBC AUTO-ENTMCNC: 33.1 G/DL (ref 31.4–37.4)
MCV RBC AUTO: 94 FL (ref 82–98)
MONOCYTES # BLD AUTO: 0.63 THOUSAND/ÂΜL (ref 0.17–1.22)
MONOCYTES NFR BLD AUTO: 8 % (ref 4–12)
NEUTROPHILS # BLD AUTO: 5.03 THOUSANDS/ÂΜL (ref 1.85–7.62)
NEUTS SEG NFR BLD AUTO: 65 % (ref 43–75)
NONHDLC SERPL-MCNC: 99 MG/DL
NRBC BLD AUTO-RTO: 0 /100 WBCS
PLATELET # BLD AUTO: 300 THOUSANDS/UL (ref 149–390)
PMV BLD AUTO: 10.9 FL (ref 8.9–12.7)
POTASSIUM SERPL-SCNC: 4.2 MMOL/L (ref 3.5–5.3)
PROT SERPL-MCNC: 6.7 G/DL (ref 6.4–8.4)
RBC # BLD AUTO: 4.74 MILLION/UL (ref 3.81–5.12)
SODIUM SERPL-SCNC: 138 MMOL/L (ref 135–147)
T4 FREE SERPL-MCNC: 0.97 NG/DL (ref 0.61–1.12)
TIBC SERPL-MCNC: 345 UG/DL (ref 250–450)
TRIGL SERPL-MCNC: 159 MG/DL
TSH SERPL DL<=0.05 MIU/L-ACNC: 4.05 UIU/ML (ref 0.45–4.5)
UIBC SERPL-MCNC: 226 UG/DL (ref 155–355)
WBC # BLD AUTO: 7.84 THOUSAND/UL (ref 4.31–10.16)

## 2024-04-22 PROCEDURE — 80061 LIPID PANEL: CPT

## 2024-04-22 PROCEDURE — 82306 VITAMIN D 25 HYDROXY: CPT

## 2024-04-22 PROCEDURE — 83550 IRON BINDING TEST: CPT

## 2024-04-22 PROCEDURE — 84439 ASSAY OF FREE THYROXINE: CPT

## 2024-04-22 PROCEDURE — 80053 COMPREHEN METABOLIC PANEL: CPT

## 2024-04-22 PROCEDURE — 84443 ASSAY THYROID STIM HORMONE: CPT

## 2024-04-22 PROCEDURE — 82728 ASSAY OF FERRITIN: CPT

## 2024-04-22 PROCEDURE — 83540 ASSAY OF IRON: CPT

## 2024-04-22 PROCEDURE — 36415 COLL VENOUS BLD VENIPUNCTURE: CPT

## 2024-04-22 PROCEDURE — 85025 COMPLETE CBC W/AUTO DIFF WBC: CPT

## 2024-04-24 ENCOUNTER — OFFICE VISIT (OUTPATIENT)
Dept: FAMILY MEDICINE CLINIC | Facility: CLINIC | Age: 49
End: 2024-04-24
Payer: COMMERCIAL

## 2024-04-24 VITALS
BODY MASS INDEX: 32.6 KG/M2 | TEMPERATURE: 97.3 F | DIASTOLIC BLOOD PRESSURE: 84 MMHG | SYSTOLIC BLOOD PRESSURE: 132 MMHG | WEIGHT: 184 LBS | HEART RATE: 71 BPM | OXYGEN SATURATION: 98 % | HEIGHT: 63 IN | RESPIRATION RATE: 16 BRPM

## 2024-04-24 DIAGNOSIS — E03.9 ACQUIRED HYPOTHYROIDISM: Primary | ICD-10-CM

## 2024-04-24 DIAGNOSIS — E55.9 VITAMIN D DEFICIENCY: ICD-10-CM

## 2024-04-24 DIAGNOSIS — F41.9 ANXIETY: ICD-10-CM

## 2024-04-24 PROCEDURE — 99214 OFFICE O/P EST MOD 30 MIN: CPT | Performed by: FAMILY MEDICINE

## 2024-04-24 RX ORDER — ERGOCALCIFEROL 1.25 MG/1
50000 CAPSULE ORAL WEEKLY
Qty: 12 CAPSULE | Refills: 0 | Status: SHIPPED | OUTPATIENT
Start: 2024-04-24 | End: 2024-07-11

## 2024-04-24 RX ORDER — FLUOXETINE HYDROCHLORIDE 20 MG/1
20 CAPSULE ORAL DAILY
Qty: 90 CAPSULE | Refills: 0 | Status: SHIPPED | OUTPATIENT
Start: 2024-04-24

## 2024-04-24 RX ORDER — MULTIVITAMIN
1 CAPSULE ORAL DAILY
COMMUNITY

## 2024-04-24 RX ORDER — LEVOTHYROXINE SODIUM 175 UG/1
175 TABLET ORAL 2 TIMES WEEKLY
Qty: 24 TABLET | Refills: 0 | Status: SHIPPED | OUTPATIENT
Start: 2024-04-25 | End: 2024-07-24

## 2024-04-24 NOTE — PROGRESS NOTES
Name: Meaghan Fitzgerald      : 1975      MRN: [id number]  Encounter Provider: Chet Sanchez MD  Encounter Date: 2024   Encounter department: [facility]    Assessment & Plan     1. Acquired hypothyroidism  Assessment & Plan:  Lab Results   Component Value Date    TPI3MOZXXWEV 4.051 2024    FREET4 0.97 2024   Currently on levothyroxine 150 mcg daily.  TSH on the higher end of normal.  Add levothyroxine 175 mcg on  and Sundays.  Repeat TSH in 6 weeks    Orders:  -     levothyroxine (Euthyrox) 175 mcg tablet; Take 1 tablet (175 mcg total) by mouth 2 (two) times a week Take 1 tablet (175 mcg total) by mouth Saturday and   -     TSH + Free T4; Future    2. Anxiety  Assessment & Plan:  Increase Prozac to 20 mg daily.  Monitor response.  If unable to tolerate can decrease back to 10 mg.    Orders:  -     FLUoxetine (PROzac) 20 mg capsule; Take 1 capsule (20 mg total) by mouth daily    3. Vitamin D deficiency  -     ergocalciferol (VITAMIN D2) 50,000 units; Take 1 capsule (50,000 Units total) by mouth once a week for 12 doses      Orders listed above  Obtain blood work  Follow-up for annual physical       Subjective      Patient presents today for 4-week recheck.  She was started on Prozac for anxiety at last visit.  She has been tolerating the medication.  Overall feels some improvement with the Prozac but still continues to have breakthrough anxiety.  Also continues to have fatigue and hair loss.   She obtained blood work prior to this visit.        Review of Systems   Constitutional:  Positive for fatigue. Negative for activity change and fever.   Eyes:  Negative for visual disturbance.   Respiratory:  Negative for shortness of breath.    Cardiovascular:  Negative for chest pain.   Gastrointestinal:  Negative for abdominal pain, constipation, diarrhea and nausea.   Endocrine: Negative for cold intolerance and heat intolerance.   Musculoskeletal:  Negative  "for back pain.   Skin:  Negative for rash.   Neurological:  Negative for headaches.   Psychiatric/Behavioral:  Negative for confusion. The patient is nervous/anxious.        Current Outpatient Medications on File Prior to Visit   Medication Sig    levothyroxine 150 mcg tablet Take 1 tablet (150 mcg total) by mouth daily    Melatonin 10 MG CAPS Take 10 mg by mouth daily at bedtime    Multiple Vitamin (multivitamin) capsule Take 1 capsule by mouth daily    [DISCONTINUED] FLUoxetine (PROzac) 10 mg capsule Take 1 capsule (10 mg total) by mouth daily       Objective     /84 (BP Location: Left arm, Patient Position: Sitting, Cuff Size: Large)   Pulse 71   Temp (!) 97.3 °F (36.3 °C) (Temporal)   Resp 16   Ht 5' 3\" (1.6 m)   Wt 83.5 kg (184 lb)   SpO2 98%   BMI 32.59 kg/m²     Physical Exam  Vitals and nursing note reviewed.   Constitutional:       Appearance: She is well-developed.   HENT:      Head: Normocephalic and atraumatic.   Cardiovascular:      Rate and Rhythm: Normal rate and regular rhythm.   Pulmonary:      Effort: Pulmonary effort is normal.      Breath sounds: Normal breath sounds.   Neurological:      General: No focal deficit present.      Mental Status: She is alert.   Psychiatric:         Mood and Affect: Mood normal.         Behavior: Behavior normal.       Chet Sanchez MD    "

## 2024-04-24 NOTE — ASSESSMENT & PLAN NOTE
Lab Results   Component Value Date    LSQ3XAAEWOQU 4.051 04/22/2024    FREET4 0.97 04/22/2024   Currently on levothyroxine 150 mcg daily.  TSH on the higher end of normal.  Add levothyroxine 175 mcg on Saturdays and Sundays.  Repeat TSH in 6 weeks

## 2024-04-24 NOTE — ASSESSMENT & PLAN NOTE
Increase Prozac to 20 mg daily.  Monitor response.  If unable to tolerate can decrease back to 10 mg.

## 2024-05-09 NOTE — PROGRESS NOTES
Assessment/Plan   Problem List Items Addressed This Visit    None  Visit Diagnoses       Well woman exam    -  Primary    Relevant Orders    Liquid-based pap, screening    Encounter for screening colonoscopy        Relevant Orders    Ambulatory referral for colon cancer education            Discussion    All questions have been answered to her satisfaction  RTO for APE or sooner if needed  Pap done today.   Will plan to watch menses for now given recent abnormal episode. Will let us know if she is having continued irregularity and can consider further work up. We did review menstrual changes that can occur in perimenopausal time frame as well.       Subjective     HPI   Meaghan Fitzgerald is a 49 y.o. female who presents for annual well woman exam.     LMP - 5/1/24 ; Periods are typically monthly and last a few days. This past month had 2 full periods in the same months.     No vulvar itch/burn; No vaginal itch/burn; No abn discharge or odor; No urinary sx - burning/pain/frequency/hematuria  Had sling placed last year with Dr Li.     No concerning breast masses, asymmetry, nipple discharge or bleeding, changes in skin of breast, or breast tenderness bilaterally    No abd/pelvic pain or HAs;     No menopausal symptoms.    Pt is sexually active in a mutually monog/ sexual relationship; No issues with intercourse; She declines sti/hiv/hep testing; Feels safe at home  Current contraception: peggy    (+) PCP for routine Bw/care;    Last Pap : 3/12/19 WNL   History of abnormal Pap smear: denies   Mammo:10/19/23 BIRADS 2   Abnormal mammo: denies   Colonoscopy:ordered    Review of Systems   Constitutional: Negative.    Respiratory: Negative.     Gastrointestinal: Negative.    Endocrine: Negative.    Genitourinary: Negative.        The following portions of the patient's history were reviewed and updated as appropriate: allergies, current medications, past family history, past medical history, past social  history, past surgical history, and problem list.         OB History          2    Para   2    Term   2            AB        Living   2         SAB        IAB        Ectopic        Multiple        Live Births   2                 Past Medical History:   Diagnosis Date    Depression     Disease of thyroid gland     Melanoma in situ of right upper arm (HCC)        Past Surgical History:   Procedure Laterality Date    NV CYSTOURETHROSCOPY N/A 3/20/2023    Procedure: CYSTO;  Surgeon: Khari Li MD;  Location: [facility];  Service: UroGynecology           NV SLING OPERATION STRESS INCONTINENCE N/A 3/20/2023    Procedure: RETROPUBIC SLING;  Surgeon: Khari Li MD;  Location: [facility];  Service: UroGynecology              Family History   Problem Relation Age of Onset    Breast cancer Mother 69    Hypertension Mother     Diabetes Father     Heart disease Father     No Known Problems Sister     No Known Problems Daughter     Breast cancer Maternal Grandmother         unknown age    Stomach cancer Maternal Grandfather         unknown age    No Known Problems Paternal Grandmother     No Known Problems Paternal Grandfather     No Known Problems Daughter     No Known Problems Sister     No Known Problems Maternal Aunt        Social History     Socioeconomic History    Marital status: /Civil Union     Spouse name: Not on file    Number of children: Not on file    Years of education: Not on file    Highest education level: Not on file   Occupational History    Not on file   Tobacco Use    Smoking status: Every Day     Current packs/day: 0.50     Average packs/day: 0.5 packs/day for 28.0 years (14.0 ttl pk-yrs)     Types: Cigarettes    Smokeless tobacco: Never   Vaping Use    Vaping status: Never Used   Substance and Sexual Activity    Alcohol use: Yes     Alcohol/week: 2.0 standard drinks of alcohol     Types: 1 Glasses of wine, 1 Cans of beer per week     Comment: socially    Drug use: No     "Sexual activity: Yes     Partners: Male     Birth control/protection: None   Other Topics Concern    Not on file   Social History Narrative    Not on file     Social Determinants of Health     Financial Resource Strain: Not on file   Food Insecurity: Not on file   Transportation Needs: Not on file   Physical Activity: Not on file   Stress: Not on file   Social Connections: Not on file   Intimate Partner Violence: Not on file   Housing Stability: Not on file         Current Outpatient Medications:     ergocalciferol (VITAMIN D2) 50,000 units, Take 1 capsule (50,000 Units total) by mouth once a week for 12 doses, Disp: 12 capsule, Rfl: 0    FLUoxetine (PROzac) 20 mg capsule, Take 1 capsule (20 mg total) by mouth daily, Disp: 90 capsule, Rfl: 0    levothyroxine (Euthyrox) 175 mcg tablet, Take 1 tablet (175 mcg total) by mouth 2 (two) times a week Take 1 tablet (175 mcg total) by mouth Saturday and Sunday, Disp: 24 tablet, Rfl: 0    levothyroxine 150 mcg tablet, Take 1 tablet (150 mcg total) by mouth daily, Disp: 90 tablet, Rfl: 1    Melatonin 10 MG CAPS, Take 10 mg by mouth daily at bedtime, Disp: , Rfl:     Multiple Vitamin (multivitamin) capsule, Take 1 capsule by mouth daily, Disp: , Rfl:     No Known Allergies    Objective   Vitals:    05/14/24 1005   BP: 120/82   BP Location: Left arm   Patient Position: Sitting   Cuff Size: Large   Weight: 83.5 kg (184 lb)   Height: 5' 3\" (1.6 m)     Physical Exam  Vitals reviewed.   HENT:      Head: Normocephalic and atraumatic.   Cardiovascular:      Rate and Rhythm: Normal rate and regular rhythm.   Pulmonary:      Effort: Pulmonary effort is normal.      Breath sounds: Normal breath sounds.   Chest:   Breasts:     Breasts are symmetrical.      Right: No swelling, bleeding, inverted nipple, mass, nipple discharge, skin change or tenderness.      Left: No swelling, bleeding, inverted nipple, mass, nipple discharge, skin change or tenderness.   Abdominal:      General: Abdomen " is flat. Bowel sounds are normal.      Palpations: Abdomen is soft.      Tenderness: There is no abdominal tenderness. There is no right CVA tenderness, left CVA tenderness or guarding.   Genitourinary:     General: Normal vulva.      Pubic Area: No rash.       Labia:         Right: No rash, tenderness, lesion or injury.         Left: No rash, tenderness, lesion or injury.       Urethra: No prolapse, urethral pain, urethral swelling or urethral lesion.      Vagina: Normal. No signs of injury and foreign body. No vaginal discharge or erythema.      Cervix: Normal.      Uterus: Normal.       Adnexa: Right adnexa normal and left adnexa normal.   Musculoskeletal:      Cervical back: Neck supple.   Lymphadenopathy:      Upper Body:      Right upper body: No axillary adenopathy.      Left upper body: No axillary adenopathy.   Skin:     General: Skin is warm and dry.   Neurological:      Mental Status: She is alert and oriented to person, place, and time.   Psychiatric:         Mood and Affect: Mood normal.         Behavior: Behavior normal.         Thought Content: Thought content normal.         Judgment: Judgment normal.         There are no Patient Instructions on file for this visit.

## 2024-05-14 ENCOUNTER — ANNUAL EXAM (OUTPATIENT)
Dept: OBGYN CLINIC | Facility: CLINIC | Age: 49
End: 2024-05-14
Payer: COMMERCIAL

## 2024-05-14 VITALS
DIASTOLIC BLOOD PRESSURE: 82 MMHG | HEIGHT: 63 IN | SYSTOLIC BLOOD PRESSURE: 120 MMHG | BODY MASS INDEX: 32.6 KG/M2 | WEIGHT: 184 LBS

## 2024-05-14 DIAGNOSIS — Z12.11 ENCOUNTER FOR SCREENING COLONOSCOPY: ICD-10-CM

## 2024-05-14 DIAGNOSIS — Z01.419 WELL WOMAN EXAM: Primary | ICD-10-CM

## 2024-05-14 PROCEDURE — G0145 SCR C/V CYTO,THINLAYER,RESCR: HCPCS | Performed by: PHYSICIAN ASSISTANT

## 2024-05-14 PROCEDURE — G0476 HPV COMBO ASSAY CA SCREEN: HCPCS | Performed by: PHYSICIAN ASSISTANT

## 2024-05-14 PROCEDURE — 99396 PREV VISIT EST AGE 40-64: CPT | Performed by: PHYSICIAN ASSISTANT

## 2024-05-15 LAB
HPV HR 12 DNA CVX QL NAA+PROBE: NEGATIVE
HPV16 DNA CVX QL NAA+PROBE: NEGATIVE
HPV18 DNA CVX QL NAA+PROBE: NEGATIVE

## 2024-05-20 LAB
LAB AP GYN PRIMARY INTERPRETATION: NORMAL
Lab: NORMAL

## 2024-07-14 DIAGNOSIS — E55.9 VITAMIN D DEFICIENCY: ICD-10-CM

## 2024-07-14 DIAGNOSIS — E03.9 ACQUIRED HYPOTHYROIDISM: ICD-10-CM

## 2024-07-14 RX ORDER — LEVOTHYROXINE SODIUM 175 UG/1
175 TABLET ORAL 2 TIMES WEEKLY
Qty: 24 TABLET | Refills: 0 | Status: SHIPPED | OUTPATIENT
Start: 2024-07-15 | End: 2024-10-13

## 2024-07-15 RX ORDER — ERGOCALCIFEROL 1.25 MG/1
50000 CAPSULE ORAL WEEKLY
Qty: 12 CAPSULE | Refills: 0 | Status: SHIPPED | OUTPATIENT
Start: 2024-07-15 | End: 2024-10-01

## 2024-07-15 RX ORDER — MULTIVITAMIN
1 CAPSULE ORAL DAILY
Qty: 90 CAPSULE | Refills: 3 | Status: SHIPPED | OUTPATIENT
Start: 2024-07-15

## 2024-07-21 DIAGNOSIS — F41.9 ANXIETY: ICD-10-CM

## 2024-07-21 RX ORDER — FLUOXETINE HYDROCHLORIDE 20 MG/1
20 CAPSULE ORAL DAILY
Qty: 90 CAPSULE | Refills: 0 | Status: SHIPPED | OUTPATIENT
Start: 2024-07-21

## 2024-08-05 DIAGNOSIS — E03.8 HYPOTHYROIDISM DUE TO HASHIMOTO'S THYROIDITIS: ICD-10-CM

## 2024-08-05 DIAGNOSIS — E06.3 HYPOTHYROIDISM DUE TO HASHIMOTO'S THYROIDITIS: ICD-10-CM

## 2024-08-05 RX ORDER — LEVOTHYROXINE SODIUM 0.15 MG/1
150 TABLET ORAL DAILY
Qty: 90 TABLET | Refills: 1 | Status: SHIPPED | OUTPATIENT
Start: 2024-08-05

## 2024-08-27 ENCOUNTER — TELEPHONE (OUTPATIENT)
Age: 49
End: 2024-08-27

## 2024-08-27 ENCOUNTER — TELEPHONE (OUTPATIENT)
Dept: FAMILY MEDICINE CLINIC | Facility: CLINIC | Age: 49
End: 2024-08-27

## 2024-08-27 NOTE — TELEPHONE ENCOUNTER
Pt requested a lab order for her thyroid test. Pt goes to [facility]. Therefore, please contact pt when lab order is ready or if you have any further questions. Thank you for your help.

## 2024-08-27 NOTE — TELEPHONE ENCOUNTER
Being answered on another note.  
Patient called requesting lab order for thyroid. Will call back to let her know when its done  
Yes

## 2024-08-27 NOTE — TELEPHONE ENCOUNTER
Called patient, No answer, Left message calling to clarify exactly what kind of blood work she is requesting.

## 2024-08-28 DIAGNOSIS — E03.9 ACQUIRED HYPOTHYROIDISM: Primary | ICD-10-CM

## 2024-09-11 LAB
T4 FREE SERPL-MCNC: 1.4 NG/DL (ref 0.8–1.8)
TSH SERPL-ACNC: 0.32 MIU/L

## 2024-09-26 ENCOUNTER — TELEPHONE (OUTPATIENT)
Age: 49
End: 2024-09-26

## 2024-09-26 NOTE — TELEPHONE ENCOUNTER
Patient has thyroid lab tests from April 24 that she said her insurance will not cover unless they are re-coded to preventative. Her preferred lab is [facility] and she went to a [facility] lab by mistake. Informed bill is going to go into collection as of Oct 13.    Insurance asked that a representative call the [facility] Provider line [phone]

## 2024-09-27 NOTE — TELEPHONE ENCOUNTER
I went into the encounter where the lab was ordered and updated the code to a wellness code and then called out billing department and asked them to rebill the corrected lab to the insurance company to have it covered.    Detailed message left for patient.

## 2024-09-30 ENCOUNTER — OFFICE VISIT (OUTPATIENT)
Dept: FAMILY MEDICINE CLINIC | Facility: CLINIC | Age: 49
End: 2024-09-30
Payer: COMMERCIAL

## 2024-09-30 VITALS
HEART RATE: 76 BPM | OXYGEN SATURATION: 96 % | TEMPERATURE: 97.6 F | BODY MASS INDEX: 31.8 KG/M2 | HEIGHT: 63 IN | SYSTOLIC BLOOD PRESSURE: 130 MMHG | WEIGHT: 179.5 LBS | RESPIRATION RATE: 17 BRPM | DIASTOLIC BLOOD PRESSURE: 82 MMHG

## 2024-09-30 DIAGNOSIS — B00.1 COLD SORE: ICD-10-CM

## 2024-09-30 DIAGNOSIS — Z00.00 ANNUAL PHYSICAL EXAM: Primary | ICD-10-CM

## 2024-09-30 DIAGNOSIS — E03.9 ACQUIRED HYPOTHYROIDISM: ICD-10-CM

## 2024-09-30 DIAGNOSIS — M77.11 LATERAL EPICONDYLITIS OF RIGHT ELBOW: ICD-10-CM

## 2024-09-30 DIAGNOSIS — Z12.31 ENCOUNTER FOR SCREENING MAMMOGRAM FOR BREAST CANCER: ICD-10-CM

## 2024-09-30 DIAGNOSIS — Z12.11 SCREENING FOR COLORECTAL CANCER: ICD-10-CM

## 2024-09-30 DIAGNOSIS — Z12.12 SCREENING FOR COLORECTAL CANCER: ICD-10-CM

## 2024-09-30 PROCEDURE — 99396 PREV VISIT EST AGE 40-64: CPT | Performed by: FAMILY MEDICINE

## 2024-09-30 PROCEDURE — 99214 OFFICE O/P EST MOD 30 MIN: CPT | Performed by: FAMILY MEDICINE

## 2024-09-30 RX ORDER — LEVOTHYROXINE SODIUM 175 UG/1
175 TABLET ORAL 2 TIMES WEEKLY
Qty: 24 TABLET | Refills: 0 | Status: SHIPPED | OUTPATIENT
Start: 2024-09-30 | End: 2024-12-29

## 2024-09-30 RX ORDER — VALACYCLOVIR HYDROCHLORIDE 1 G/1
2000 TABLET, FILM COATED ORAL 2 TIMES DAILY
Qty: 20 TABLET | Refills: 0 | Status: SHIPPED | OUTPATIENT
Start: 2024-09-30 | End: 2024-10-01

## 2024-09-30 NOTE — ASSESSMENT & PLAN NOTE
Continue levothyroxine 150 mcg Monday through Friday.  Levothyroxine 175 mcg Saturday and Sunday.  Stable on current medications.  Repeat labs  Orders:    levothyroxine (Euthyrox) 175 mcg tablet; Take 1 tablet (175 mcg total) by mouth 2 (two) times a week Take 1 tablet (175 mcg total) by mouth Saturday and Sunday    TSH, 3rd generation with Free T4 reflex; Future

## 2024-09-30 NOTE — PATIENT INSTRUCTIONS
"Patient Education     Routine physical for adults   The Basics   Written by the doctors and editors at [facility]   What is a physical? -- A physical is a routine visit, or \"check-up,\" with your doctor. You might also hear it called a \"wellness visit\" or \"preventive visit.\"  During each visit, the doctor will:   Ask about your physical and mental health   Ask about your habits, behaviors, and lifestyle   Do an exam   Give you vaccines if needed   Talk to you about any medicines you take   Give advice about your health   Answer your questions  Getting regular check-ups is an important part of taking care of your health. It can help your doctor find and treat any problems you have. But it's also important for preventing health problems.  A routine physical is different from a \"sick visit.\" A sick visit is when you see a doctor because of a health concern or problem. Since physicals are scheduled ahead of time, you can think about what you want to ask the doctor.  How often should I get a physical? -- It depends on your age and health. In general, for people age 21 years and older:   If you are younger than 50 years, you might be able to get a physical every 3 years.   If you are 50 years or older, your doctor might recommend a physical every year.  If you have an ongoing health condition, like diabetes or high blood pressure, your doctor will probably want to see you more often.  What happens during a physical? -- In general, each visit will include:   Physical exam - The doctor or nurse will check your height, weight, heart rate, and blood pressure. They will also look at your eyes and ears. They will ask about how you are feeling and whether you have any symptoms that bother you.   Medicines - It's a good idea to bring a list of all the medicines you take to each doctor visit. Your doctor will talk to you about your medicines and answer any questions. Tell them if you are having any side effects that bother you. You " "should also tell them if you are having trouble paying for any of your medicines.   Habits and behaviors - This includes:   Your diet   Your exercise habits   Whether you smoke, drink alcohol, or use drugs   Whether you are sexually active   Whether you feel safe at home  Your doctor will talk to you about things you can do to improve your health and lower your risk of health problems. They will also offer help and support. For example, if you want to quit smoking, they can give you advice and might prescribe medicines. If you want to improve your diet or get more physical activity, they can help you with this, too.   Lab tests, if needed - The tests you get will depend on your age and situation. For example, your doctor might want to check your:   Cholesterol   Blood sugar   Iron level   Vaccines - The recommended vaccines will depend on your age, health, and what vaccines you already had. Vaccines are very important because they can prevent certain serious or deadly infections.   Discussion of screening - \"Screening\" means checking for diseases or other health problems before they cause symptoms. Your doctor can recommend screening based on your age, risk, and preferences. This might include tests to check for:   Cancer, such as breast, prostate, cervical, ovarian, colorectal, prostate, lung, or skin cancer   Sexually transmitted infections, such as chlamydia and gonorrhea   Mental health conditions like depression and anxiety  Your doctor will talk to you about the different types of screening tests. They can help you decide which screenings to have. They can also explain what the results might mean.   Answering questions - The physical is a good time to ask the doctor or nurse questions about your health. If needed, they can refer you to other doctors or specialists, too.  Adults older than 65 years often need other care, too. As you get older, your doctor will talk to you about:   How to prevent falling at " home   Hearing or vision tests   Memory testing   How to take your medicines safely   Making sure that you have the help and support you need at home  All topics are updated as new evidence becomes available and our peer review process is complete.  This topic retrieved from [employer] on: May 02, 2024.  Topic 164647 Version 1.0  Release: 32.4.3 - C32.122  © 2024 UpToDate, Inc. and/or its affiliates. All rights reserved.  Consumer Information Use and Disclaimer   Disclaimer: This generalized information is a limited summary of diagnosis, treatment, and/or medication information. It is not meant to be comprehensive and should be used as a tool to help the user understand and/or assess potential diagnostic and treatment options. It does NOT include all information about conditions, treatments, medications, side effects, or risks that may apply to a specific patient. It is not intended to be medical advice or a substitute for the medical advice, diagnosis, or treatment of a health care provider based on the health care provider's examination and assessment of a patient's specific and unique circumstances. Patients must speak with a health care provider for complete information about their health, medical questions, and treatment options, including any risks or benefits regarding use of medications. This information does not endorse any treatments or medications as safe, effective, or approved for treating a specific patient. UpToDate, Inc. and its affiliates disclaim any warranty or liability relating to this information or the use thereof.The use of this information is governed by the Terms of Use, available at https://www.woltersPockeeuwer.com/en/know/clinical-effectiveness-terms. 2024© UpToDate, Inc. and its affiliates and/or licensors. All rights reserved.  Copyright   © 2024 UpToDate, Inc. and/or its affiliates. All rights reserved.

## 2024-10-13 DIAGNOSIS — F41.9 ANXIETY: ICD-10-CM

## 2024-12-12 ENCOUNTER — OFFICE VISIT (OUTPATIENT)
Dept: FAMILY MEDICINE CLINIC | Facility: CLINIC | Age: 49
End: 2024-12-12
Payer: COMMERCIAL

## 2024-12-12 VITALS
HEART RATE: 71 BPM | DIASTOLIC BLOOD PRESSURE: 74 MMHG | OXYGEN SATURATION: 99 % | WEIGHT: 182 LBS | TEMPERATURE: 98 F | HEIGHT: 63 IN | RESPIRATION RATE: 18 BRPM | SYSTOLIC BLOOD PRESSURE: 128 MMHG | BODY MASS INDEX: 32.25 KG/M2

## 2024-12-12 DIAGNOSIS — M54.50 ACUTE RIGHT-SIDED LOW BACK PAIN WITHOUT SCIATICA: Primary | ICD-10-CM

## 2024-12-12 DIAGNOSIS — Z12.31 ENCOUNTER FOR SCREENING MAMMOGRAM FOR BREAST CANCER: ICD-10-CM

## 2024-12-12 PROCEDURE — 99213 OFFICE O/P EST LOW 20 MIN: CPT | Performed by: FAMILY MEDICINE

## 2024-12-12 RX ORDER — METHOCARBAMOL 500 MG/1
500 TABLET, FILM COATED ORAL
Qty: 30 TABLET | Refills: 0 | Status: SHIPPED | OUTPATIENT
Start: 2024-12-12

## 2024-12-12 RX ORDER — METHYLPREDNISOLONE 4 MG/1
TABLET ORAL
Qty: 21 EACH | Refills: 0 | Status: SHIPPED | OUTPATIENT
Start: 2024-12-12

## 2024-12-12 NOTE — PROGRESS NOTES
Name: Meaghan Fitzgerald      : 1975      MRN: [id number]  Encounter Provider: Chet Sanchez MD  Encounter Date: 2024   Encounter department: [facility]  :  Assessment & Plan  Acute right-sided low back pain without sciatica  Suspect musculoskeletal back pain.  Trial patient on steroid and muscle relaxer.  Provided patient with exercises.  No red flag symptoms on examination.  No need for imaging at this time.  Follow-up with no improvement  Orders:    methocarbamol (ROBAXIN) 500 mg tablet; Take 1 tablet (500 mg total) by mouth daily at bedtime as needed for muscle spasms    methylPREDNISolone 4 MG tablet therapy pack; Use as directed on package           History of Present Illness     Patient presents with:  Back Pain: Started Monday, lower back pain         Back Pain  This is a recurrent problem. The current episode started in the past 7 days. The problem occurs rarely. The problem has been gradually worsening since onset. The pain is present in the sacro-iliac. The quality of the pain is described as cramping. The pain does not radiate. The pain is at a severity of 8/10. The pain is The same all the time. The symptoms are aggravated by twisting. Stiffness is present All day. Associated symptoms include weakness. Pertinent negatives include no abdominal pain, bladder incontinence, bowel incontinence, chest pain, dysuria, fever, headaches, leg pain, numbness, paresis, paresthesias, pelvic pain, perianal numbness, tingling or weight loss. Risk factors include obesity and poor posture.     Review of Systems   Constitutional:  Negative for fever and weight loss.   Cardiovascular:  Negative for chest pain.   Gastrointestinal:  Negative for abdominal pain and bowel incontinence.   Genitourinary:  Negative for bladder incontinence, dysuria and pelvic pain.   Musculoskeletal:  Positive for back pain.   Neurological:  Positive for weakness. Negative for tingling, numbness, headaches  "and paresthesias.       Objective   /74 (BP Location: Left arm, Patient Position: Standing, Cuff Size: Large)   Pulse 71   Temp 98 °F (36.7 °C) (Temporal)   Resp 18   Ht 5' 3\" (1.6 m)   Wt 82.6 kg (182 lb)   SpO2 99%   BMI 32.24 kg/m²      Physical Exam  Vitals and nursing note reviewed.   Constitutional:       Appearance: Normal appearance. She is well-developed.   HENT:      Head: Normocephalic and atraumatic.   Cardiovascular:      Rate and Rhythm: Normal rate and regular rhythm.   Pulmonary:      Effort: Pulmonary effort is normal.      Breath sounds: Normal breath sounds.   Abdominal:      General: Bowel sounds are normal.      Palpations: Abdomen is soft.   Musculoskeletal:         General: Tenderness present.      Cervical back: Normal range of motion.      Lumbar back: Spasms and tenderness present. No deformity or bony tenderness. Decreased range of motion. Negative right straight leg raise test and negative left straight leg raise test.   Skin:     General: Skin is warm.   Neurological:      General: No focal deficit present.      Mental Status: She is alert.   Psychiatric:         Mood and Affect: Mood normal.         Speech: Speech normal.         "

## 2024-12-18 ENCOUNTER — HOSPITAL ENCOUNTER (OUTPATIENT)
Dept: MAMMOGRAPHY | Facility: HOSPITAL | Age: 49
Discharge: HOME | End: 2024-12-18
Attending: FAMILY MEDICINE
Payer: COMMERCIAL

## 2024-12-18 VITALS — BODY MASS INDEX: 32.25 KG/M2 | HEIGHT: 63 IN | WEIGHT: 182 LBS

## 2024-12-18 DIAGNOSIS — E03.9 ACQUIRED HYPOTHYROIDISM: ICD-10-CM

## 2024-12-18 DIAGNOSIS — Z12.31 ENCOUNTER FOR SCREENING MAMMOGRAM FOR BREAST CANCER: ICD-10-CM

## 2024-12-18 PROCEDURE — 77063 BREAST TOMOSYNTHESIS BI: CPT

## 2024-12-18 PROCEDURE — 77067 SCR MAMMO BI INCL CAD: CPT

## 2024-12-18 RX ORDER — LEVOTHYROXINE SODIUM 175 UG/1
TABLET ORAL
Qty: 24 TABLET | Refills: 1 | Status: SHIPPED | OUTPATIENT
Start: 2024-12-18

## 2024-12-23 ENCOUNTER — RESULTS FOLLOW-UP (OUTPATIENT)
Dept: FAMILY MEDICINE CLINIC | Facility: CLINIC | Age: 49
End: 2024-12-23

## 2025-02-08 DIAGNOSIS — E06.3 HYPOTHYROIDISM DUE TO HASHIMOTO'S THYROIDITIS: ICD-10-CM

## 2025-02-10 RX ORDER — LEVOTHYROXINE SODIUM 150 UG/1
150 TABLET ORAL DAILY
Qty: 90 TABLET | Refills: 1 | Status: SHIPPED | OUTPATIENT
Start: 2025-02-10

## 2025-04-05 DIAGNOSIS — F41.9 ANXIETY: ICD-10-CM

## 2025-05-28 DIAGNOSIS — F41.9 ANXIETY: ICD-10-CM

## 2025-06-10 DIAGNOSIS — E03.9 ACQUIRED HYPOTHYROIDISM: ICD-10-CM

## 2025-06-10 RX ORDER — LEVOTHYROXINE SODIUM 175 UG/1
TABLET ORAL
Qty: 24 TABLET | Refills: 1 | Status: SHIPPED | OUTPATIENT
Start: 2025-06-10

## 2025-06-13 ENCOUNTER — TELEPHONE (OUTPATIENT)
Dept: FAMILY MEDICINE CLINIC | Facility: CLINIC | Age: 50
End: 2025-06-13

## 2025-06-13 NOTE — TELEPHONE ENCOUNTER
Patient will contact the office with the information since she did not have the paper at the time that I called.

## 2025-06-13 NOTE — TELEPHONE ENCOUNTER
----- Message from Chet Sanchez MD sent at 5/28/2025  1:59 PM EDT -----  Regarding: Billing  Can we please contact patient and find out what needed to be billed as a preventative code?

## 2025-06-18 DIAGNOSIS — B00.1 COLD SORE: ICD-10-CM

## 2025-06-18 RX ORDER — VALACYCLOVIR HYDROCHLORIDE 1 G/1
2000 TABLET, FILM COATED ORAL 2 TIMES DAILY
Qty: 20 TABLET | Refills: 0 | Status: SHIPPED | OUTPATIENT
Start: 2025-06-18 | End: 2025-06-19

## 2025-06-18 NOTE — TELEPHONE ENCOUNTER
Patient called back has paper to go over with for billing of lab. Stated was placed as a routine instead of a preventive service lab. Warm transfer unavailable patient would like a call back. Thank you.

## 2025-06-18 NOTE — TELEPHONE ENCOUNTER
Requested Prescriptions     Pending Prescriptions Disp Refills    valACYclovir (VALTREX) 1,000 mg tablet 20 tablet 0     Sig: Take 2 tablets (2,000 mg total) by mouth 2 (two) times a day for 1 dose      Out of medication

## 2025-07-09 ENCOUNTER — OFFICE VISIT (OUTPATIENT)
Dept: FAMILY MEDICINE CLINIC | Facility: CLINIC | Age: 50
End: 2025-07-09
Payer: COMMERCIAL

## 2025-07-09 VITALS
WEIGHT: 178 LBS | RESPIRATION RATE: 17 BRPM | OXYGEN SATURATION: 97 % | HEIGHT: 63 IN | DIASTOLIC BLOOD PRESSURE: 78 MMHG | HEART RATE: 76 BPM | BODY MASS INDEX: 31.54 KG/M2 | SYSTOLIC BLOOD PRESSURE: 128 MMHG | TEMPERATURE: 97.8 F

## 2025-07-09 DIAGNOSIS — J06.9 UPPER RESPIRATORY TRACT INFECTION, UNSPECIFIED TYPE: Primary | ICD-10-CM

## 2025-07-09 PROCEDURE — 99213 OFFICE O/P EST LOW 20 MIN: CPT | Performed by: FAMILY MEDICINE

## 2025-07-09 RX ORDER — AZITHROMYCIN 250 MG/1
TABLET, FILM COATED ORAL
Qty: 6 TABLET | Refills: 0 | Status: SHIPPED | OUTPATIENT
Start: 2025-07-09 | End: 2025-07-14

## 2025-07-09 RX ORDER — ALBUTEROL SULFATE 90 UG/1
2 INHALANT RESPIRATORY (INHALATION) EVERY 6 HOURS PRN
Qty: 6.7 G | Refills: 5 | Status: SHIPPED | OUTPATIENT
Start: 2025-07-09

## 2025-07-09 RX ORDER — BENZONATATE 100 MG/1
100 CAPSULE ORAL 3 TIMES DAILY PRN
Qty: 20 CAPSULE | Refills: 0 | Status: SHIPPED | OUTPATIENT
Start: 2025-07-09

## 2025-07-09 NOTE — PROGRESS NOTES
"Name: Meaghan Fitzgerald      : 1975      MRN: [id number]  Encounter Provider: Chet Sanchez MD  Encounter Date: 2025   Encounter department: [facility]  :  Assessment & Plan  Upper respiratory tract infection, unspecified type  Continue with symptomatic treatment.   Continue flonase and mucinex   Follow up in office if no improvement     Orders:    albuterol (Proventil HFA) 90 mcg/act inhaler; Inhale 2 puffs every 6 (six) hours as needed for wheezing    benzonatate (TESSALON PERLES) 100 mg capsule; Take 1 capsule (100 mg total) by mouth 3 (three) times a day as needed for cough    azithromycin (Zithromax) 250 mg tablet; Take 2 tablets (500 mg total) by mouth daily for 1 day, THEN 1 tablet (250 mg total) daily for 4 days.           History of Present Illness   Patient presents with:  Cough: fever cough with green phlem sore throat          Review of Systems   Constitutional:  Positive for fever.   Respiratory:  Positive for cough, shortness of breath and wheezing.        Objective   /78 (BP Location: Left arm, Patient Position: Sitting, Cuff Size: Standard)   Pulse 76   Temp 97.8 °F (36.6 °C) (Temporal)   Resp 17   Ht 5' 3\" (1.6 m)   Wt 80.7 kg (178 lb)   SpO2 97%   BMI 31.53 kg/m²      Physical Exam  Constitutional:       Appearance: Normal appearance. She is ill-appearing.   HENT:      Head: Normocephalic and atraumatic.     Cardiovascular:      Rate and Rhythm: Normal rate and regular rhythm.   Pulmonary:      Effort: Pulmonary effort is normal.      Breath sounds: Wheezing present.     Neurological:      Mental Status: She is alert.         "

## (undated) DEVICE — ADHESIVE SKIN HIGH VISCOSITY EXOFIN 1ML

## (undated) DEVICE — ALLENTOWN DR  LUCENTE S LAP PK: Brand: CARDINAL HEALTH

## (undated) DEVICE — IV FLUSH NSS 10ML POSIFLUSH

## (undated) DEVICE — SUT VICRYL 2-0 SH 27 IN UNDYED J417H

## (undated) DEVICE — GLOVE PI ULTRA TOUCH SZ.8.0

## (undated) DEVICE — NEEDLE SPINAL 22G X 3.5IN  QUINCKE

## (undated) DEVICE — PREMIUM DRY TRAY LF: Brand: MEDLINE INDUSTRIES, INC.

## (undated) DEVICE — CATH FOLEY 18FR 5ML 2 WAY UNCOATED SILICONE

## (undated) DEVICE — NEEDLE HYPO 22G X 1-1/2 IN

## (undated) DEVICE — HEMOSTATIC MATRIX SURGIFLO 8ML W/THROMBIN

## (undated) DEVICE — INTENDED FOR TISSUE SEPARATION, AND OTHER PROCEDURES THAT REQUIRE A SHARP SURGICAL BLADE TO PUNCTURE OR CUT.: Brand: BARD-PARKER SAFETY BLADES SIZE 11, STERILE

## (undated) DEVICE — EXIDINE 4 PCT

## (undated) DEVICE — UNDER BUTTOCKS DRAPE W/FLUID CONTROL POUCH: Brand: CONVERTORS

## (undated) DEVICE — SCD SEQUENTIAL COMPRESSION COMFORT SLEEVE MEDIUM KNEE LENGTH: Brand: KENDALL SCD